# Patient Record
Sex: FEMALE | Race: BLACK OR AFRICAN AMERICAN | Employment: OTHER | URBAN - METROPOLITAN AREA
[De-identification: names, ages, dates, MRNs, and addresses within clinical notes are randomized per-mention and may not be internally consistent; named-entity substitution may affect disease eponyms.]

---

## 2017-04-07 ENCOUNTER — OFFICE VISIT (OUTPATIENT)
Dept: PAIN MANAGEMENT | Age: 47
End: 2017-04-07

## 2017-04-07 DIAGNOSIS — Z71.89 COUNSELING AND COORDINATION OF CARE: Primary | ICD-10-CM

## 2017-04-07 NOTE — PROGRESS NOTES
Ms. Johanny Gilman attended the Education Class facilitated by Arabella Mahmood LPN. Objectives of this class are to review practice policies, protocols and the Controlled Substances Consent and Treatment Agreement, discuss \"what if\" scenarios, introduce staff, and provide an opportunity for questions and answers. Ultimately, goals for this class are for Ms. Johanny Gilman to:    · Be educated - Learn as much as possible about her pain and related treatment, our policies and the Controlled Substances Agreement. · Be responsible - Follow the providers advice regarding treatment recommendations, medications, and prescription information. · Be confident - Better manage her pain and return to a more functional lifestyle. At least 45 minutes was spent with the patient in face-to-face contact today.

## 2017-04-14 ENCOUNTER — OFFICE VISIT (OUTPATIENT)
Dept: PAIN MANAGEMENT | Age: 47
End: 2017-04-14

## 2017-04-14 VITALS
HEART RATE: 71 BPM | SYSTOLIC BLOOD PRESSURE: 167 MMHG | WEIGHT: 239 LBS | DIASTOLIC BLOOD PRESSURE: 86 MMHG | BODY MASS INDEX: 36.34 KG/M2

## 2017-04-14 DIAGNOSIS — R29.818 POSITIVE LHERMITTE'S SIGN: ICD-10-CM

## 2017-04-14 DIAGNOSIS — G89.4 CHRONIC PAIN SYNDROME: ICD-10-CM

## 2017-04-14 DIAGNOSIS — G89.0 CENTRAL PAIN SYNDROME: Primary | ICD-10-CM

## 2017-04-14 DIAGNOSIS — G35 MS (MULTIPLE SCLEROSIS) (HCC): ICD-10-CM

## 2017-04-14 DIAGNOSIS — Z79.899 ENCOUNTER FOR LONG-TERM (CURRENT) USE OF HIGH-RISK MEDICATION: ICD-10-CM

## 2017-04-14 LAB
ALCOHOL UR POC: NORMAL
AMPHETAMINES UR POC: NEGATIVE
BARBITURATES UR POC: NEGATIVE
BENZODIAZEPINES UR POC: NEGATIVE
BUPRENORPHINE UR POC: NORMAL
CANNABINOIDS UR POC: NEGATIVE
CARISOPRODOL UR POC: NORMAL
COCAINE UR POC: NEGATIVE
FENTANYL UR POC: NEGATIVE
MDMA/ECSTASY UR POC: NORMAL
METHADONE UR POC: NEGATIVE
METHAMPHETAMINE UR POC: NEGATIVE
METHYLPHENIDATE UR POC: NEGATIVE
OPIATES UR POC: NEGATIVE
OXYCODONE UR POC: NEGATIVE
PHENCYCLIDINE UR POC: NEGATIVE
PROPOXYPHENE UR POC: NORMAL
TRAMADOL UR POC: NORMAL
TRICYCLICS UR POC: NEGATIVE

## 2017-04-14 RX ORDER — OXYCODONE AND ACETAMINOPHEN 5; 325 MG/1; MG/1
1 TABLET ORAL
Qty: 60 TAB | Refills: 0 | Status: SHIPPED | OUTPATIENT
Start: 2017-05-12 | End: 2017-06-15 | Stop reason: SDUPTHER

## 2017-04-14 RX ORDER — PREGABALIN 75 MG/1
75 CAPSULE ORAL 2 TIMES DAILY
Qty: 60 CAP | Refills: 5 | Status: SHIPPED | OUTPATIENT
Start: 2017-04-14 | End: 2017-05-14

## 2017-04-14 RX ORDER — LEVETIRACETAM 250 MG/1
TABLET ORAL AS NEEDED
COMMUNITY
End: 2021-12-17

## 2017-04-14 RX ORDER — OXYCODONE AND ACETAMINOPHEN 5; 325 MG/1; MG/1
1 TABLET ORAL
Qty: 60 TAB | Refills: 0 | Status: SHIPPED | OUTPATIENT
Start: 2017-04-14 | End: 2017-06-15 | Stop reason: SDUPTHER

## 2017-04-14 RX ORDER — SIMVASTATIN 20 MG/1
20 TABLET, FILM COATED ORAL
COMMUNITY

## 2017-04-14 NOTE — PROGRESS NOTES
HISTORY OF PRESENT ILLNESS  Danette Simpson is a 48 y.o. female. HPI she returns to the Center for pain management for evaluation and treatment of chronic, severe pain related to underlying multiple sclerosis. She was originally seen at the Center on 4/22/15. Her history in brief as follows: She was diagnosed with multiple sclerosis in 2007. CSF was positive. She was initially treated with Avonex. She received 2 doses of Tysabri but developed an allergic reaction as well as JCV antibody positivity. She was then treated with Tecfidera until recently when it was suggested that she try Gilenya. She was reluctant to start this medication secondary to potential side effects and was then started on Plegridy. She is currently receiving Aubagio which she does not feel is particularly effective. She continues to have significant pain which includes fluctuating muscle spasms, headaches, stiffness of the lower extremities, and headaches. Particularly troubling is a positive Lhermitte sign    She has been tried on Keppra, gabapentin, Vicodin and Percocet. She believes she may have tried Lyrica in the past but does not recall. Patient has been averaging 7 out of 10 in his tenuous. Physical activity and mobility as well as mood and sleep are all fair. Low risk is identified on the opioid risk screening tool    A score of 20 on the PHQ-9 is consistent with severe affect of distress    Her PCS score of 37 is consistent with significant catastrophic behavior elevation of the rumination, magnification, and helplessness subscales are noted. A current review of the  does not identify any inconsistency. UDS obtained and reviewed; formal confirmation from laboratory is pending. A salivary fluid specimen is obtained and forwarded to the laboratory for cytogenetic analysis. Review of Systems   Constitutional: Positive for malaise/fatigue.    Gastrointestinal: Positive for constipation, heartburn and nausea. Musculoskeletal: Positive for back pain, joint pain (polyarticular), myalgias and neck pain. Neurological: Positive for weakness (generalized). Psychiatric/Behavioral: The patient has insomnia. All other systems reviewed and are negative. Physical Exam   Constitutional: She is oriented to person, place, and time. She appears cachectic. She has a sickly appearance. She appears distressed. HENT:   Head: Normocephalic and atraumatic. Right Ear: External ear normal.   Left Ear: External ear normal.   Nose: Nose normal.   Mouth/Throat: Oropharynx is clear and moist. No oropharyngeal exudate. Eyes: Conjunctivae and EOM are normal. Pupils are equal, round, and reactive to light. Right eye exhibits no discharge. Left eye exhibits no discharge. No scleral icterus. Neck: Muscular tenderness: spasm. Decreased range of motion present. No Brudzinski's sign and no Kernig's sign noted. No thyromegaly present. Cardiovascular: Normal rate, regular rhythm and normal heart sounds. Pulmonary/Chest: Effort normal and breath sounds normal. No respiratory distress. She has no wheezes. She has no rales. Abdominal: Soft. She exhibits no distension. There is no tenderness. There is no rebound and no guarding. Musculoskeletal: She exhibits tenderness. Right shoulder: She exhibits decreased range of motion and tenderness. Left shoulder: She exhibits decreased range of motion and tenderness. Right elbow: She exhibits decreased range of motion. Tenderness found. Left elbow: She exhibits decreased range of motion. Tenderness found. Right wrist: She exhibits decreased range of motion and tenderness. Left wrist: She exhibits decreased range of motion and tenderness. Right hip: She exhibits decreased range of motion and tenderness. Left hip: She exhibits decreased range of motion and tenderness. Right knee: She exhibits decreased range of motion. Tenderness found. Left knee: She exhibits decreased range of motion. Tenderness found. Right ankle: She exhibits decreased range of motion. Tenderness. Left ankle: She exhibits decreased range of motion. Tenderness. Lumbar back: She exhibits decreased range of motion, tenderness, pain and spasm. Neurological: She is alert and oriented to person, place, and time. She has normal reflexes. No cranial nerve deficit or sensory deficit. She exhibits normal muscle tone. Gait abnormal. Coordination normal.   Reflex Scores:       Tricep reflexes are 2+ on the right side and 2+ on the left side. Bicep reflexes are 2+ on the right side and 2+ on the left side. Brachioradialis reflexes are 2+ on the right side and 2+ on the left side. Patellar reflexes are 2+ on the right side and 2+ on the left side. Achilles reflexes are 2+ on the right side and 2+ on the left side. Skin: Skin is warm. No rash noted. Psychiatric: Her speech is normal and behavior is normal. Judgment and thought content normal. She exhibits a depressed mood. Nursing note and vitals reviewed. ASSESSMENT and PLAN  Encounter Diagnoses   Name Primary?  Central pain syndrome Yes    Encounter for long-term (current) use of high-risk medication     MS (multiple sclerosis) (HCC)     Positive Lhermitte's sign     Chronic pain syndrome      Lyrica will be started at 75 mg twice daily to help her neuropathic pain  Percocet, 5/325, up to 2 times daily will be utilized for breakthrough pain as this has proven beneficial in the past.    Further recommendations will be based upon the above. No concerns are raised for misuse, abuse, or diversion. 1. Pain medications are prescribed with the objective of pain relief and improved physical and psychosocial function in this patient. 2. Counseled patient on proper use of prescribed medications and reviewed opioid contract.   3. Counseled patient about chronic medical conditions and their relationship to anxiety and depression and recommended mental health support as needed. 4. Reviewed with patient self-help tools, home exercise, and lifestyle changes to assist the patient in self-management of symptoms. 5. Advised patient to have a primary care provider to continue care for health maintenance and general medical conditions and support for referral to specialty care as needed. 6. Reviewed with patient the treatment plan, goals of treatment plan, and limitations of treatment plan, to include the potential for side effects from medications and procedures. If side effects occur, it is the responsibility of the patient to inform the clinic so that a change in the treatment plan can be made in a safe manner. The patient is advised that stopping prescribed medication may cause an increase in symptoms and possible medication withdrawal symptoms. The patient is informed an emergency room evaluation may be necessary if this occurs. DISPOSITION: The patients condition and plan were discussed at length and all questions were answered. The patient agrees with the plan.     Counseling occupied > 50% of visit:  Total time: 45 minutes

## 2017-05-10 ENCOUNTER — TELEPHONE (OUTPATIENT)
Dept: PAIN MANAGEMENT | Age: 47
End: 2017-05-10

## 2017-05-10 RX ORDER — GABAPENTIN 300 MG/1
300 CAPSULE ORAL 3 TIMES DAILY
Qty: 90 CAP | Refills: 5 | Status: SHIPPED | OUTPATIENT
Start: 2017-05-10 | End: 2017-06-09

## 2017-05-10 NOTE — TELEPHONE ENCOUNTER
Lyrica was denied by [employer] covers the prescribed medication for members who have had an unsuccessful 30 day tral of gabapentin AND one of the following medications: duloxetine, lidocaine patch, amitriptyline, desipramine, imipramine, nortriptyline or fluoxetine. Notes were submitted with pa - still denied lyrica. Unsuccessful trial and failure of duloxetine has been documented. Pt has been with [facility] since 2014 - they check pharmacy claims and apparently did not find a claim for gabapentin - even though it was in the progress note she had tried and failed, gabapentin this was not accepted.

## 2017-05-22 ENCOUNTER — APPOINTMENT (OUTPATIENT)
Dept: CT IMAGING | Age: 47
End: 2017-05-22
Attending: EMERGENCY MEDICINE
Payer: OTHER GOVERNMENT

## 2017-05-22 ENCOUNTER — HOSPITAL ENCOUNTER (EMERGENCY)
Age: 47
Discharge: HOME | End: 2017-05-22
Attending: EMERGENCY MEDICINE
Payer: OTHER GOVERNMENT

## 2017-05-22 VITALS
OXYGEN SATURATION: 100 % | HEART RATE: 68 BPM | BODY MASS INDEX: 34.4 KG/M2 | TEMPERATURE: 98.2 F | SYSTOLIC BLOOD PRESSURE: 142 MMHG | WEIGHT: 227 LBS | DIASTOLIC BLOOD PRESSURE: 83 MMHG | RESPIRATION RATE: 16 BRPM | HEIGHT: 68 IN

## 2017-05-22 DIAGNOSIS — R51.9 ACUTE NONINTRACTABLE HEADACHE, UNSPECIFIED HEADACHE TYPE: Primary | ICD-10-CM

## 2017-05-22 PROCEDURE — 70450 CT HEAD/BRAIN W/O DYE: CPT

## 2017-05-22 PROCEDURE — 74011250636 HC RX REV CODE- 250/636: Performed by: EMERGENCY MEDICINE

## 2017-05-22 PROCEDURE — 74011250637 HC RX REV CODE- 250/637: Performed by: EMERGENCY MEDICINE

## 2017-05-22 PROCEDURE — 99283 EMERGENCY DEPT VISIT LOW MDM: CPT

## 2017-05-22 RX ORDER — BUTALBITAL, ASPIRIN, CAFFEINE AND CODEINE PHOSPHATE 50; 325; 40; 30 MG/1; MG/1; MG/1; MG/1
1 CAPSULE ORAL
Qty: 20 CAP | Refills: 0 | Status: SHIPPED | OUTPATIENT
Start: 2017-05-22 | End: 2017-08-20

## 2017-05-22 RX ORDER — ONDANSETRON 4 MG/1
4 TABLET, ORALLY DISINTEGRATING ORAL
Status: COMPLETED | OUTPATIENT
Start: 2017-05-22 | End: 2017-05-22

## 2017-05-22 RX ORDER — HYDROMORPHONE HYDROCHLORIDE 1 MG/ML
1 INJECTION, SOLUTION INTRAMUSCULAR; INTRAVENOUS; SUBCUTANEOUS
Status: DISCONTINUED | OUTPATIENT
Start: 2017-05-22 | End: 2017-05-22

## 2017-05-22 RX ORDER — OXYCODONE AND ACETAMINOPHEN 5; 325 MG/1; MG/1
2 TABLET ORAL
Status: COMPLETED | OUTPATIENT
Start: 2017-05-22 | End: 2017-05-22

## 2017-05-22 RX ORDER — ONDANSETRON 4 MG/1
4 TABLET, ORALLY DISINTEGRATING ORAL
Qty: 14 TAB | Refills: 0 | Status: SHIPPED | OUTPATIENT
Start: 2017-05-22 | End: 2018-06-27 | Stop reason: ALTCHOICE

## 2017-05-22 RX ADMIN — ONDANSETRON 4 MG: 4 TABLET, ORALLY DISINTEGRATING ORAL at 23:13

## 2017-05-22 RX ADMIN — OXYCODONE HYDROCHLORIDE AND ACETAMINOPHEN 1 TABLET: 5; 325 TABLET ORAL at 23:14

## 2017-05-22 RX ADMIN — ONDANSETRON 4 MG: 4 TABLET, ORALLY DISINTEGRATING ORAL at 22:37

## 2017-05-23 NOTE — ED NOTES
This note will not be viewable in [address] Patient spit out Zofran tablet stating it tastes bad. Refused Dilaudid 1mg as it is an injection and feared it would hurt. This note will not be viewable in [address]

## 2017-05-23 NOTE — DISCHARGE INSTRUCTIONS
Return for pain, fever, shortness of breath, vomiting, decreased fluid intake, weakness, numbness, dizziness, or any change or concerns. Headache: Care Instructions  Your Care Instructions    Headaches have many possible causes. Most headaches aren't a sign of a more serious problem, and they will get better on their own. Home treatment may help you feel better faster. The doctor has checked you carefully, but problems can develop later. If you notice any problems or new symptoms, get medical treatment right away. Follow-up care is a key part of your treatment and safety. Be sure to make and go to all appointments, and call your doctor if you are having problems. It's also a good idea to know your test results and keep a list of the medicines you take. How can you care for yourself at home? · Do not drive if you have taken a prescription pain medicine. · Rest in a quiet, dark room until your headache is gone. Close your eyes and try to relax or go to sleep. Don't watch TV or read. · Put a cold, moist cloth or cold pack on the painful area for 10 to 20 minutes at a time. Put a thin cloth between the cold pack and your skin. · Use a warm, moist towel or a heating pad set on low to relax tight shoulder and neck muscles. · Have someone gently massage your neck and shoulders. · Take pain medicines exactly as directed. ¨ If the doctor gave you a prescription medicine for pain, take it as prescribed. ¨ If you are not taking a prescription pain medicine, ask your doctor if you can take an over-the-counter medicine. · Be careful not to take pain medicine more often than the instructions allow, because you may get worse or more frequent headaches when the medicine wears off. · Do not ignore new symptoms that occur with a headache, such as a fever, weakness or numbness, vision changes, or confusion. These may be signs of a more serious problem.   To prevent headaches  · Keep a headache diary so you can figure out what triggers your headaches. Avoiding triggers may help you prevent headaches. Record when each headache began, how long it lasted, and what the pain was like (throbbing, aching, stabbing, or dull). Write down any other symptoms you had with the headache, such as nausea, flashing lights or dark spots, or sensitivity to bright light or loud noise. Note if the headache occurred near your period. List anything that might have triggered the headache, such as certain foods (chocolate, cheese, wine) or odors, smoke, bright light, stress, or lack of sleep. · Find healthy ways to deal with stress. Headaches are most common during or right after stressful times. Take time to relax before and after you do something that has caused a headache in the past.  · Try to keep your muscles relaxed by keeping good posture. Check your jaw, face, neck, and shoulder muscles for tension, and try relaxing them. When sitting at a desk, change positions often, and stretch for 30 seconds each hour. · Get plenty of sleep and exercise. · Eat regularly and well. Long periods without food can trigger a headache. · Treat yourself to a massage. Some people find that regular massages are very helpful in relieving tension. · Limit caffeine by not drinking too much coffee, tea, or soda. But don't quit caffeine suddenly, because that can also give you headaches. · Reduce eyestrain from computers by blinking frequently and looking away from the computer screen every so often. Make sure you have proper eyewear and that your monitor is set up properly, about an arm's length away. · Seek help if you have depression or anxiety. Your headaches may be linked to these conditions. Treatment can both prevent headaches and help with symptoms of anxiety or depression. When should you call for help? Call 911 anytime you think you may need emergency care. For example, call if:  · You have signs of a stroke.  These may include:  ¨ Sudden numbness, paralysis, or weakness in your face, arm, or leg, especially on only one side of your body. ¨ Sudden vision changes. ¨ Sudden trouble speaking. ¨ Sudden confusion or trouble understanding simple statements. ¨ Sudden problems with walking or balance. ¨ A sudden, severe headache that is different from past headaches. Call your doctor now or seek immediate medical care if:  · You have a new or worse headache. · Your headache gets much worse. Where can you learn more? Go to http://waldemar-trino.info/. Enter M271 in the search box to learn more about \"Headache: Care Instructions. \"  Current as of: October 14, 2016  Content Version: 11.2  © 7693-3436 [employer]. Care instructions adapted under license by [employer] (which disclaims liability or warranty for this information). If you have questions about a medical condition or this instruction, always ask your healthcare professional. [place] [address] any warranty or liability for your use of this information.

## 2017-05-23 NOTE — ED PROVIDER NOTES
HPI Comments: 10:26 PM [de-identified] R Calvin is a 48 y.o. female with hx of HTN, MS, migraines, depression, and insomnia who presents to the ED c/o HA to the top of her head onset 1.5 hours ago while watching TV. Pain is similar to her typical migraine, is described as stabbing pain, and rated at 10/10. Pt also c/o photophobia, nausea, and blurred vision while walking. Pt tried Fioricet with no relief. Pt denies vomiting, numbness, weakness, leg pain or swelling, or any other sx at this time. The history is provided by the patient. No  was used. Past Medical History:   Diagnosis Date    Depression     Hypertension     Insomnia     Migraines     MS (multiple sclerosis) (HCC)     Sleep apnea     mild, no CPAP    Urinary urgency     Vision decreased        Past Surgical History:   Procedure Laterality Date    HX  SECTION      x1    HX GYN  2002    tubal ligation    HX HYSTERECTOMY  14         Family History:   Problem Relation Age of Onset    Cancer Father     MS Sister     MS Brother        Social History     Social History    Marital status: SINGLE     Spouse name: N/A    Number of children: N/A    Years of education: N/A     Occupational History    Not on file. Social History Main Topics    Smoking status: Former Smoker     Packs/day: 0.25     Years: 10.00     Types: Cigarettes     Quit date: 2017    Smokeless tobacco: Not on file    Alcohol use No    Drug use: No    Sexual activity: Not on file     Other Topics Concern    Not on file     Social History Narrative         ALLERGIES: Mushroom flavor; Olive extract; Pcn [penicillins]; and Shellfish derived    Review of Systems   Constitutional: Negative for chills, fatigue and fever. HENT: Negative for congestion, rhinorrhea and sore throat. Eyes: Positive for photophobia and visual disturbance (blurred vision). Respiratory: Negative for cough and shortness of breath.     Cardiovascular: Negative for chest pain, palpitations and leg swelling. Gastrointestinal: Positive for nausea. Negative for abdominal pain, diarrhea and vomiting. Genitourinary: Negative for dysuria, hematuria and urgency. Musculoskeletal: Negative for arthralgias and myalgias. Skin: Negative for rash and wound. Neurological: Positive for headaches. Negative for dizziness, weakness and numbness. Psychiatric/Behavioral: The patient is not nervous/anxious. All other systems reviewed and are negative. Vitals:    05/22/17 2210 05/22/17 2330   BP: 151/89 142/83   Pulse: 61 68   Resp: 19 16   Temp: 98.2 °F (36.8 °C)    SpO2: 99% 100%   Weight: 103 kg (227 lb)    Height: 5' 8\" (1.727 m)             Physical Exam   Constitutional: She is oriented to person, place, and time. She appears well-developed. HENT:   Head: Normocephalic. Mouth/Throat: Oropharynx is clear and moist.   Mid-parietal scalp tenderness    Eyes: Pupils are equal, round, and reactive to light. Neck: Normal range of motion. Cardiovascular: Normal rate and normal heart sounds. No murmur heard. Pulmonary/Chest: Effort normal. She has no wheezes. She has no rales. Abdominal: Soft. There is no tenderness. Musculoskeletal: Normal range of motion. She exhibits no edema. Neurological: She is alert and oriented to person, place, and time. Skin: Skin is warm and dry. Nursing note and vitals reviewed. [facility]  ED Course       Procedures    Vitals:  No data found. Medications ordered:   Medications   ondansetron (ZOFRAN ODT) tablet 4 mg (4 mg Oral Given 5/22/17 2237)   oxyCODONE-acetaminophen (PERCOCET) 5-325 mg per tablet 2 Tab (1 Tab Oral Given 5/22/17 2314)   ondansetron (ZOFRAN ODT) tablet 4 mg (4 mg Oral Given 5/22/17 2313)         Lab findings:  No results found for this or any previous visit (from the past 12 hour(s)).         X-Ray, CT or other radiology findings or impressions:  CT HEAD WO CONT   Final Result   Impression:    Normal CT of the head. Progress notes, Consult notes or additional Procedure notes:   11:36 PM I have reevaluated the patient. Patient is feeling better. Declines further tx and wants discharge at this time. Reviewed all results with pt and pt agrees with plan for discharge and appropriate follow up. All questions answered at this time. Patient was discharged in stable condition. Patient is to return to emergency department for any new or worsening condition. Disposition:  Diagnosis:   1. Acute nonintractable headache, unspecified headache type        Disposition: home    Follow-up Information     Follow up With Details Comments [facility] MD MERRILL Schedule an appointment as soon as possible for a visit in 1 day  [address]  [phone]             Discharge Medication List as of 5/22/2017 11:38 PM      START taking these medications    Details   ondansetron (ZOFRAN ODT) 4 mg disintegrating tablet Take 1 Tab by mouth every eight (8) hours as needed for Nausea. , Print, Disp-14 Tab, R-0      codeine-butalbital-aspirin-caffeine (FIORINAL-CODEINE #3) 67--40 mg per capsule Take 1 Cap by mouth every four (4) hours as needed for Pain for up to 90 days. Max Daily Amount: 6 Caps. Maximum 6 capsules daily, Print, Disp-20 Cap, R-0         CONTINUE these medications which have NOT CHANGED    Details   gabapentin (NEURONTIN) 300 mg capsule Take 1 Cap by mouth three (3) times daily for 30 days. Indications: NEUROPATHIC PAIN, Normal, Disp-90 Cap, R-5      simvastatin (ZOCOR) 20 mg tablet Take  by mouth nightly., Historical Med      levETIRAcetam (KEPPRA) 250 mg tablet Take  by mouth two (2) times a day., Historical Med      oxyCODONE-acetaminophen (PERCOCET) 5-325 mg per tablet Take 1 Tab by mouth two (2) times daily as needed for Pain for up to 30 days. Max Daily Amount: 2 Tabs.  Indications: Pain, Print, Disp-60 Tab, R-0      teriflunomide (AUBAGIO) 14 mg tab Take  by mouth daily. , Historical Med      omeprazole (PRILOSEC) 20 mg capsule Take 20 mg by mouth daily. , Historical Med      EPINEPHrine (EPIPEN) 0.3 mg/0.3 mL (1:1,000) injection 0.3 mL by IntraMUSCular route once as needed for up to 1 dose., Print, Disp-0.6 mL, R-0      lisinopril (PRINIVIL, ZESTRIL) 10 mg tablet 10 mg., Historical Med      ergocalciferol (VITAMIN D2) 50,000 unit capsule Take 50,000 Units by mouth., Historical Med         STOP taking these medications       butalbital-acetaminophen-caffeine (FIORICET) -40 mg per tablet Comments:   Reason for Stopping:                    Scribe Attestation:   Abi Todd acting as a scribe for and in the presence of Hermann Zamora MD May 22, 2017 at 10:21 PM     Signed by: Fahad Alex Chi, May 22, 2017, 10:21 PM    Provider Attestation:   I personally performed the services described in the documentation, reviewed the documentation, as recorded by the scribe in my presence, and it accurately and completely records my words and actions.      Reviewed and signed by:  Hermann Zamora MD

## 2017-06-15 ENCOUNTER — OFFICE VISIT (OUTPATIENT)
Dept: PAIN MANAGEMENT | Age: 47
End: 2017-06-15

## 2017-06-15 VITALS
BODY MASS INDEX: 34.52 KG/M2 | SYSTOLIC BLOOD PRESSURE: 113 MMHG | WEIGHT: 227 LBS | HEART RATE: 72 BPM | DIASTOLIC BLOOD PRESSURE: 80 MMHG

## 2017-06-15 DIAGNOSIS — G89.4 CHRONIC PAIN SYNDROME: ICD-10-CM

## 2017-06-15 DIAGNOSIS — G35 MS (MULTIPLE SCLEROSIS) (HCC): ICD-10-CM

## 2017-06-15 DIAGNOSIS — G89.0 CENTRAL PAIN SYNDROME: ICD-10-CM

## 2017-06-15 RX ORDER — TIZANIDINE 4 MG/1
4 TABLET ORAL 3 TIMES DAILY
Qty: 90 TAB | Refills: 1 | Status: SHIPPED | OUTPATIENT
Start: 2017-06-15 | End: 2017-09-12 | Stop reason: SDUPTHER

## 2017-06-15 RX ORDER — OXYCODONE AND ACETAMINOPHEN 5; 325 MG/1; MG/1
1 TABLET ORAL
Qty: 60 TAB | Refills: 0 | Status: SHIPPED | OUTPATIENT
Start: 2017-07-22 | End: 2017-11-09 | Stop reason: SDUPTHER

## 2017-06-15 RX ORDER — OXYCODONE AND ACETAMINOPHEN 5; 325 MG/1; MG/1
1 TABLET ORAL
Qty: 60 TAB | Refills: 0 | Status: SHIPPED | OUTPATIENT
Start: 2017-06-23 | End: 2017-07-23

## 2017-06-15 NOTE — PROGRESS NOTES
Nursing Notes    Patient presents to the office today in follow-up. Patient rates her pain at 8/10 on the numerical pain scale. Reviewed medications with counts as follows:    Rx Date filled Qty Dispensed Pill Count Last Dose Short   Oxycodone 5 mg 05/24/17 28 * 10 today no         Comments: pt only given 28 day supply    POC UDS was not performed in office today no    Any new labs or imaging since last appointment? NO     Have you been to an emergency room (ER) or urgent care clinic since your last visit? YES     Have you been hospitalized since your last visit? YES  [facility]   If yes, where, when, and reason for visit? Headache from MS, joint pain    Have you seen or consulted any other health care providers outside of the [facility]  since your last visit? NO     If yes, where, when, and reason for visit? Health Maintenance reviewed .

## 2017-06-15 NOTE — PATIENT INSTRUCTIONS
1. Continue current plan with no evidence of addiction or diversion. Stable on current medication without adverse events. 2. Refill oxycodone 5/325 mg up to 2 times daily as needed. 3. Continue gabapentin 300 mg tapering up to 3 times daily. 4. Add Zanaflex 4 mg up to 3 times daily as needed for muscle spasm/pain. 1 refill   5. Discussed risks of addiction, dependency, and opioid induced hyperalgesia.    6. Return to clinic in 2 month

## 2017-06-15 NOTE — PROGRESS NOTES
HISTORY OF PRESENT ILLNESS  Danette Simpson is a 48 y.o. female    HPI: Ms. Anna Kim  returns today for f/u of chronic, severe pain related to underlying multiple sclerosis. She was diagnosed with multiple sclerosis in 2007 with CSF positive. Prior treatment with Avonex, Tysabri, Tecfidera, Avenox, Plegridy, and Gilenya by her Rheumatologist with no improvement. Currently treated with Aubagio. She has also tried Keppra, gabapentin, Vicodin, flexeril, and baclofen. Lyrica denied by her insurance. Today is my first visit with Ms. Simpson. She was doing well previously with Lyrica with significant improvement in her pain. She says that her insurance has recently denied her request for Lyrica refill. She has tried many different medications and treatments in the past with little benefit. She has tried gabapentin in the past as well with no improvement. She was started back on gabapentin when her Lyrica was denied. She reports minimal improvement so far but has been trouble tolerating the medication. I have recommended that she taper up her medication slowly. She verbally agrees. She continues to complain of significant muscle spasms, headaches, stiffness of lower extremities, and headaches. She has tried muscle relaxers in the past including Flexeril and baclofen. She was doing well with baclofen but was causing nausea. I have recommended that we try Zanaflex. I will have her follow-up in 2 months for further evaluation recommendation. Current medication management consists of Percocet, 5/325, up to 2 times daily PRN and gabapentin 300 mg q 8 hrs. Medications are helping with pain control and quality of life. Her pain is 5-6/10 with medication and 9/10 without. Pt describes pain as sharp and muscle spasms. Pain may affect her walking due to muscle spasms. Aggravating factors include standing and walking. Relieved with rest, medication, and avoiding painful activities.  Current treatment is helping to improve general activity, mood, walking, sleep, enjoyment of life    In the past 30 days, the patient reports approximately 40% pain relief with current treatment/medications. Pt does report constipation but is well controlled with diet modifications  She  is otherwise doing well with no other complaints today. She denies any adverse events including nausea, vomiting, dizziness, increased constipation, hallucinations, or seizures. Allergies   Allergen Reactions    Mushroom Flavor Other (comments)     Mushroom,    Olive Extract Other (comments)    Pcn [Penicillins] Unknown (comments)    Shellfish Derived Other (comments)       Past Surgical History:   Procedure Laterality Date    HX  SECTION      x1    HX GYN  2002    tubal ligation    HX HYSTERECTOMY  14         Review of Systems   Constitutional: Positive for malaise/fatigue. Negative for chills, fever and weight loss. HENT: Negative for congestion and sore throat. Eyes: Negative for blurred vision and double vision. Respiratory: Negative for cough, shortness of breath and wheezing. Cardiovascular: Negative for chest pain and palpitations. Gastrointestinal: Positive for constipation, heartburn and nausea. Negative for abdominal pain, diarrhea and vomiting. Genitourinary: Negative. Musculoskeletal: Positive for back pain, joint pain, myalgias and neck pain. Neurological: Positive for weakness. Negative for dizziness, seizures, loss of consciousness and headaches. Endo/Heme/Allergies: Does not bruise/bleed easily. Psychiatric/Behavioral: Negative for depression. The patient is not nervous/anxious and does not have insomnia. All other systems reviewed and are negative. Physical Exam   Constitutional: She is oriented to person, place, and time and well-developed, well-nourished, and in no distress. No distress. HENT:   Head: Normocephalic and atraumatic.    Eyes: EOM are normal.   Pulmonary/Chest: Effort normal.   Neurological: She is alert and oriented to person, place, and time. Skin: Skin is warm and dry. No rash noted. She is not diaphoretic. No erythema. Psychiatric: Mood, memory, affect and judgment normal.   Nursing note and vitals reviewed. ASSESSMENT:    1. MS (multiple sclerosis) ([facility] [address]    2. Central pain syndrome    3. Chronic pain syndrome           [place] Prescription Monitoring Program was reviewed which does not demonstrate aberrancies and/or inconsistencies with regard to the historical prescribing of controlled medications to this patient by other providers. PLAN / Pt Instructions:  1. Continue current plan with no evidence of addiction or diversion. Stable on current medication without adverse events. 2. Refill oxycodone 5/325 mg up to 2 times daily as needed. 3. Continue gabapentin 300 mg tapering up to 3 times daily. 4. Add Zanaflex 4 mg up to 3 times daily as needed for muscle spasm/pain. 1 refill   5. Discussed risks of addiction, dependency, and opioid induced hyperalgesia. 6. Return to clinic in 2 month    Medications Ordered Today   Medications    oxyCODONE-acetaminophen (PERCOCET) 5-325 mg per tablet     Sig: Take 1 Tab by mouth two (2) times daily as needed for Pain for up to 30 days. Max Daily Amount: 2 Tabs. Indications: Pain     Dispense:  60 Tab     Refill:  0    oxyCODONE-acetaminophen (PERCOCET) 5-325 mg per tablet     Sig: Take 1 Tab by mouth two (2) times daily as needed for Pain for up to 30 days. Max Daily Amount: 2 Tabs. Indications: Pain     Dispense:  60 Tab     Refill:  0    tiZANidine (ZANAFLEX) 4 mg tablet     Sig: Take 1 Tab by mouth three (3) times daily for 30 days. Dispense:  90 Tab     Refill:  1       Pain medications prescribed with the objective of pain relief and improved physical and psychosocial function in this patient.     Spent 25 minutes with patient today reviewing the treatment plan, goals of treatment plan, and limitations of the treatment plan, to include the potential for side effects from medications and procedures. Jhon Simmons 6/15/2017      Note: Please excuse any typographical errors. Voice recognition software was used for this note and may cause mistakes.

## 2017-09-12 ENCOUNTER — OFFICE VISIT (OUTPATIENT)
Dept: PAIN MANAGEMENT | Age: 47
End: 2017-09-12

## 2017-09-12 VITALS
HEART RATE: 61 BPM | HEIGHT: 68 IN | RESPIRATION RATE: 20 BRPM | BODY MASS INDEX: 34.4 KG/M2 | SYSTOLIC BLOOD PRESSURE: 136 MMHG | TEMPERATURE: 97.5 F | DIASTOLIC BLOOD PRESSURE: 81 MMHG | WEIGHT: 227 LBS

## 2017-09-12 DIAGNOSIS — G89.0 CENTRAL PAIN SYNDROME: ICD-10-CM

## 2017-09-12 DIAGNOSIS — Z79.899 ENCOUNTER FOR LONG-TERM (CURRENT) USE OF HIGH-RISK MEDICATION: ICD-10-CM

## 2017-09-12 DIAGNOSIS — G89.4 CHRONIC PAIN SYNDROME: Primary | ICD-10-CM

## 2017-09-12 DIAGNOSIS — G35 MS (MULTIPLE SCLEROSIS) (HCC): ICD-10-CM

## 2017-09-12 LAB
ALCOHOL UR POC: NORMAL
AMPHETAMINES UR POC: NORMAL
BARBITURATES UR POC: NORMAL
BENZODIAZEPINES UR POC: NORMAL
BUPRENORPHINE UR POC: NORMAL
CANNABINOIDS UR POC: NORMAL
CARISOPRODOL UR POC: NORMAL
COCAINE UR POC: NORMAL
FENTANYL UR POC: NORMAL
MDMA/ECSTASY UR POC: NORMAL
METHADONE UR POC: NORMAL
METHAMPHETAMINE UR POC: NORMAL
METHYLPHENIDATE UR POC: NORMAL
OPIATES UR POC: NORMAL
OXYCODONE UR POC: NORMAL
PHENCYCLIDINE UR POC: NORMAL
PROPOXYPHENE UR POC: NORMAL
TRAMADOL UR POC: NORMAL
TRICYCLICS UR POC: NORMAL

## 2017-09-12 RX ORDER — TIZANIDINE 4 MG/1
4 TABLET ORAL 3 TIMES DAILY
Qty: 90 TAB | Refills: 3 | Status: SHIPPED | OUTPATIENT
Start: 2017-09-12 | End: 2018-05-07 | Stop reason: SDUPTHER

## 2017-09-12 RX ORDER — OXYCODONE AND ACETAMINOPHEN 7.5; 325 MG/1; MG/1
1 TABLET ORAL
Qty: 60 TAB | Refills: 0 | Status: SHIPPED | OUTPATIENT
Start: 2017-10-19 | End: 2017-11-09 | Stop reason: SDUPTHER

## 2017-09-12 RX ORDER — OXYCODONE AND ACETAMINOPHEN 5; 325 MG/1; MG/1
1 TABLET ORAL
Qty: 60 TAB | Refills: 0 | Status: CANCELLED | OUTPATIENT
Start: 2017-09-12 | End: 2017-10-12

## 2017-09-12 RX ORDER — TIZANIDINE 4 MG/1
4 TABLET ORAL 3 TIMES DAILY
Qty: 90 TAB | Refills: 1 | Status: CANCELLED | OUTPATIENT
Start: 2017-09-12 | End: 2017-10-12

## 2017-09-12 RX ORDER — GABAPENTIN 300 MG/1
300 CAPSULE ORAL 3 TIMES DAILY
Qty: 90 CAP | Refills: 5 | Status: CANCELLED | OUTPATIENT
Start: 2017-09-12 | End: 2017-10-12

## 2017-09-12 RX ORDER — OXYCODONE AND ACETAMINOPHEN 7.5; 325 MG/1; MG/1
1 TABLET ORAL
Qty: 60 TAB | Refills: 0 | Status: SHIPPED | OUTPATIENT
Start: 2017-09-20 | End: 2017-11-09 | Stop reason: SDUPTHER

## 2017-09-12 NOTE — PATIENT INSTRUCTIONS
1. Continue current plan with no evidence of addiction or diversion. Stable on current medication without adverse events. 2. Refill and adjust oxycodone 5/325 mg up to 7.5/325 mg 2 times daily as needed. 3. DisContinue gabapentin 300 mg  4. Refill Zanaflex 4 mg up to 3 times daily as needed for muscle spasm/pain. 1 refill   5. Discussed risks of addiction, dependency, and opioid induced hyperalgesia.    6. Return to clinic in 2 month

## 2017-09-12 NOTE — PROGRESS NOTES
HISTORY OF PRESENT ILLNESS  Danette Simpson is a 50 y.o. female    HPI: Ms. Jhoan Bettencourt  returns today for f/u of chronic, severe pain related to underlying multiple sclerosis. She was diagnosed with multiple sclerosis in 2007 with CSF positive. Prior treatment with Avonex, Tysabri, Tecfidera, Avenox, Plegridy, and Gilenya by her Rheumatologist with no improvement. Currently treated with Aubagio. She has also tried Keppra, gabapentin, Vicodin, flexeril, and baclofen. Lyrica denied by her insurance. Ms. Jhoan Bettencourt continues unchanged since last visit. She has been doing well with her current treatment plan but does still continue of pain unimproved with her oxycodone. She has plenty of her medication left over. She says that she does take it when she absolutely has to but does not seem to be that effective. She has had to discontinue gabapentin due to complications with other medications. Lyrica was not approved by her insurance. We did add Zanaflex which has been effective. I have agreed to adjust her Percocet up to 7.5/325 mg. She will continue to use this medication on an as-needed basis only. Current medication management consists of Percocet, 5/325, up to 2 times daily PRN. Medications are helping with pain control and quality of life. Her pain is 6-7/10 with medication and 9/10 without. Pt describes pain as sharp and muscle spasms. Pain may affect her walking due to muscle spasms. Aggravating factors include standing and walking. Relieved with rest, medication, and avoiding painful activities. Current treatment is helping to improve general activity, mood, walking, sleep, enjoyment of life    In the past 30 days, the patient reports approximately 40% pain relief with current treatment/medications. Pt does report constipation but is well controlled with diet modifications  She  is otherwise doing well with no other complaints today.  She denies any adverse events including nausea, vomiting, dizziness, increased constipation, hallucinations, or seizures. Allergies   Allergen Reactions    Mushroom Flavor Other (comments)     Mushroom,    Olive Extract Other (comments)    Pcn [Penicillins] Unknown (comments)    Shellfish Derived Other (comments)       Past Surgical History:   Procedure Laterality Date    HX  SECTION      x1    HX GYN  2002    tubal ligation    HX HYSTERECTOMY  14         Review of Systems   Constitutional: Positive for malaise/fatigue. Negative for chills, fever and weight loss. HENT: Negative for congestion and sore throat. Eyes: Negative for blurred vision and double vision. Respiratory: Negative for cough, shortness of breath and wheezing. Cardiovascular: Negative for chest pain and palpitations. Gastrointestinal: Positive for constipation, heartburn and nausea. Negative for abdominal pain, diarrhea and vomiting. Genitourinary: Negative. Musculoskeletal: Positive for back pain, joint pain, myalgias and neck pain. Neurological: Positive for weakness. Negative for dizziness, seizures, loss of consciousness and headaches. Endo/Heme/Allergies: Does not bruise/bleed easily. Psychiatric/Behavioral: Negative for depression. The patient is not nervous/anxious and does not have insomnia. All other systems reviewed and are negative. Physical Exam   Constitutional: She is oriented to person, place, and time and well-developed, well-nourished, and in no distress. No distress. HENT:   Head: Normocephalic and atraumatic. Eyes: EOM are normal.   Pulmonary/Chest: Effort normal.   Neurological: She is alert and oriented to person, place, and time. Skin: Skin is warm and dry. No rash noted. She is not diaphoretic. No erythema. Psychiatric: Mood, memory, affect and judgment normal.   Nursing note and vitals reviewed. ASSESSMENT:    1. Chronic pain syndrome    2. Central pain syndrome    3. MS (multiple sclerosis) ([facility] [address]    4. Encounter for long-term (current) use of high-risk medication           [place] Prescription Monitoring Program was reviewed which does not demonstrate aberrancies and/or inconsistencies with regard to the historical prescribing of controlled medications to this patient by other providers. PLAN / Pt Instructions:  1. Continue current plan with no evidence of addiction or diversion. Stable on current medication without adverse events. 2. Refill and adjust oxycodone 5/325 mg up to 7.5/325 mg 2 times daily as needed. 3. DisContinue gabapentin 300 mg  4. Refill Zanaflex 4 mg up to 3 times daily as needed for muscle spasm/pain. 1 refill   5. Discussed risks of addiction, dependency, and opioid induced hyperalgesia. 6. Return to clinic in 2 months    Medications Ordered Today   Medications    oxyCODONE-acetaminophen (PERCOCET 7.5) 7.5-325 mg per tablet     Sig: Take 1 Tab by mouth two (2) times daily as needed for Pain for up to 30 days. Max Daily Amount: 2 Tabs. Dispense:  60 Tab     Refill:  0    oxyCODONE-acetaminophen (PERCOCET 7.5) 7.5-325 mg per tablet     Sig: Take 1 Tab by mouth two (2) times daily as needed for Pain for up to 30 days. Max Daily Amount: 2 Tabs. Dispense:  60 Tab     Refill:  0    tiZANidine (ZANAFLEX) 4 mg tablet     Sig: Take 1 Tab by mouth three (3) times daily for 30 days. Dispense:  90 Tab     Refill:  3       Pain medications prescribed with the objective of pain relief and improved physical and psychosocial function in this patient. Spent 25 minutes with patient today reviewing the treatment plan, goals of treatment plan, and limitations of the treatment plan, to include the potential for side effects from medications and procedures. Jhon Uribe 9/12/2017      Note: Please excuse any typographical errors. Voice recognition software was used for this note and may cause mistakes.

## 2017-09-12 NOTE — ACP (ADVANCE CARE PLANNING)
The pt states that she has a living will. This document was not found in the pt's chart. She was asked to bring in a copy of this so that it can be scanned in to her chart. She verbalized understanding.

## 2017-09-12 NOTE — MR AVS SNAPSHOT
Visit Information Date & Time Provider Department Dept. Phone Encounter #  
 9/12/2017  8:40 AM SUAD Sosa [facility] [phone] [id number] Follow-up Instructions Return in about 2 months (around 11/12/2017). Upcoming Health Maintenance Date Due DTaP/Tdap/Td series (1 - Tdap) 7/8/1989 PAP AKA CERVICAL CYTOLOGY 7/8/1989 INFLUENZA AGE 9 TO ADULT 8/1/2017 Allergies as of 9/12/2017  Review Complete On: 9/12/2017 By: SUAD Sosa Severity Noted Reaction Type Reactions Mushroom Flavor  05/05/2015    Other (comments) Mushroom, Olive Extract  05/05/2015    Other (comments) Pcn [Penicillins]  12/15/2015    Unknown (comments) Shellfish Derived  05/05/2015    Other (comments) Current Immunizations  Never Reviewed No immunizations on file. Not reviewed this visit You Were Diagnosed With   
  
 Codes Comments Chronic pain syndrome    -  Primary ICD-10-CM: G89.4 ICD-9-CM: 338.4 Central pain syndrome     ICD-10-CM: G89.0 ICD-9-CM: 338.0 [facility]     ICD-10-CM: G35 
ICD-9-CM: 312 Encounter for long-term (current) use of high-risk medication     ICD-10-CM: Z79.899 ICD-9-CM: V58.69 Vitals BP Pulse Temp Resp Height(growth percentile) Weight(growth percentile) 136/81 61 97.5 °F (36.4 °C) 20 5' 8\" (1.727 m) 227 lb (103 kg) BMI OB Status Smoking Status 34.52 kg/m2 Hysterectomy Former Smoker Vitals History BMI and BSA Data Body Mass Index Body Surface Area 34.52 kg/m 2 2.22 m 2 Preferred Pharmacy Pharmacy Name [phone] - [place], [address] AT [address] [phone] Your Updated Medication List  
  
   
This list is accurate as of: 9/12/17  9:21 AM.  Always use your most recent med list.  
  
  
  
  
 AUBAGIO 14 mg Tab Generic drug:  teriflunomide Take  by mouth daily. EPINEPHrine 0.3 mg/0.3 mL injection Commonly known as:  EPIPEN  
0.3 mL by IntraMUSCular route once as needed for up to 1 dose. levETIRAcetam 250 mg tablet Commonly known as:  KEPPRA Take  by mouth two (2) times a day. lisinopril 10 mg tablet Commonly known as:  PRINIVIL, ZESTRIL  
10 mg.  
  
 ondansetron 4 mg disintegrating tablet Commonly known as:  ZOFRAN ODT Take 1 Tab by mouth every eight (8) hours as needed for Nausea. * oxyCODONE-acetaminophen 7.5-325 mg per tablet Commonly known as:  PERCOCET 7.5 Take 1 Tab by mouth two (2) times daily as needed for Pain for up to 30 days. Max Daily Amount: 2 Tabs. Start taking on:  9/20/2017 * oxyCODONE-acetaminophen 7.5-325 mg per tablet Commonly known as:  PERCOCET 7.5 Take 1 Tab by mouth two (2) times daily as needed for Pain for up to 30 days. Max Daily Amount: 2 Tabs. Start taking on:  10/19/2017 PriLOSEC 20 mg capsule Generic drug:  omeprazole Take 20 mg by mouth daily. simvastatin 20 mg tablet Commonly known as:  ZOCOR Take  by mouth nightly. tiZANidine 4 mg tablet Commonly known as:  Berry Player Take 1 Tab by mouth three (3) times daily for 30 days. VITAMIN D2 50,000 unit capsule Generic drug:  ergocalciferol Take 50,000 Units by mouth. * Notice: This list has 2 medication(s) that are the same as other medications prescribed for you. Read the directions carefully, and ask your doctor or other care provider to review them with you. Prescriptions Printed Refills  
 oxyCODONE-acetaminophen (PERCOCET 7.5) 7.5-325 mg per tablet 0 Starting on: 9/20/2017 Sig: Take 1 Tab by mouth two (2) times daily as needed for Pain for up to 30 days. Max Daily Amount: 2 Tabs. Class: Print Route: Oral  
 oxyCODONE-acetaminophen (PERCOCET 7.5) 7.5-325 mg per tablet 0 Starting on: 10/19/2017 Sig: Take 1 Tab by mouth two (2) times daily as needed for Pain for up to 30 days. Max Daily Amount: 2 Tabs. Class: Print Route: Oral  
  
Prescriptions Sent to Pharmacy Refills  
 tiZANidine (ZANAFLEX) 4 mg tablet 3 Sig: Take 1 Tab by mouth three (3) times daily for 30 days. Class: Normal  
 Pharmacy: [facility] [address] [place] [address] AT [address] Ph #: [phone] Route: Oral  
  
We Performed the Following AMB POC DRUG SCREEN () [ [facility]] DRUG SCREEN [[id number] Custom] Follow-up Instructions Return in about 2 months (around 11/12/2017). Patient Instructions 1. Continue current plan with no evidence of addiction or diversion. Stable on current medication without adverse events. 2. Refill and adjust oxycodone 5/325 mg up to 7.5/325 mg 2 times daily as needed. 3. DisContinue gabapentin 300 mg 
4. Refill Zanaflex 4 mg up to 3 times daily as needed for muscle spasm/pain. 1 refill 5. Discussed risks of addiction, dependency, and opioid induced hyperalgesia. 6. Return to clinic in 2 month Introducing [facility]! Ezraadean Saint introduces [employer] patient portal. Now you can access parts of your medical record, email your doctor's office, and request medication refills online. 1. In your internet browser, go to [email or web address] 2. Click on the First Time User? Click Here link in the Sign In box. You will see the New Member Sign Up page. 3. Enter your [employer] Access Code exactly as it appears below. You will not need to use this code after youve completed the sign-up process. If you do not sign up before the expiration date, you must request a new code. · [employer] Access Code: [email or web address] Expires: 12/11/2017  9:21 AM 
 
4. Enter the last four digits of your Social Security Number (xxxx) and Date of Birth (mm/dd/yyyy) as indicated and click Submit.  You will be taken to the next sign-up page. 5. Create a [employer] ID. This will be your [employer] login ID and cannot be changed, so think of one that is secure and easy to remember. 6. Create a [employer] password. You can change your password at any time. 7. Enter your Password Reset Question and Answer. This can be used at a later time if you forget your password. 8. Enter your e-mail address. You will receive e-mail notification when new information is available in [address] 9. Click Sign Up. You can now view and download portions of your medical record. 10. Click the Download Summary menu link to download a portable copy of your medical information. If you have questions, please visit the Frequently Asked Questions section of the [employer] website. Remember, [employer] is NOT to be used for urgent needs. For medical emergencies, dial 911. Now available from your iPhone and Android! Please provide this summary of care documentation to your next provider. Your primary care clinician is listed as Chris Machado Ii. If you have any questions after today's visit, please call [phone].

## 2017-09-12 NOTE — PROGRESS NOTES
Nursing Notes    Patient presents to the office today in follow-up. Patient rates her pain at 7/10 on the numerical pain scale. Reviewed medications with counts as follows:    Rx Date filled Qty Dispensed Pill Count Last Dose Short   Percocet 5/325 mg  08/01/17 60 19 2 days ago no                                   POC UDS was performed in office today    Any new labs or imaging since last appointment? NO    Have you been to an emergency room (ER) or urgent care clinic since your last visit? NO            Have you been hospitalized since your last visit? NO     If yes, where, when, and reason for visit? Have you seen or consulted any other health care providers outside of the [address] [place] [address]  since your last visit? NO     If yes, where, when, and reason for visit? HM deferred to pcp.

## 2017-11-09 ENCOUNTER — OFFICE VISIT (OUTPATIENT)
Dept: PAIN MANAGEMENT | Age: 47
End: 2017-11-09

## 2017-11-09 VITALS
BODY MASS INDEX: 37.13 KG/M2 | RESPIRATION RATE: 14 BRPM | DIASTOLIC BLOOD PRESSURE: 79 MMHG | HEART RATE: 66 BPM | TEMPERATURE: 98.2 F | SYSTOLIC BLOOD PRESSURE: 136 MMHG | HEIGHT: 68 IN | WEIGHT: 245 LBS

## 2017-11-09 DIAGNOSIS — G35 MS (MULTIPLE SCLEROSIS) (HCC): ICD-10-CM

## 2017-11-09 DIAGNOSIS — G89.4 CHRONIC PAIN SYNDROME: ICD-10-CM

## 2017-11-09 DIAGNOSIS — G89.0 CENTRAL PAIN SYNDROME: ICD-10-CM

## 2017-11-09 RX ORDER — OXYCODONE AND ACETAMINOPHEN 7.5; 325 MG/1; MG/1
1 TABLET ORAL
Qty: 60 TAB | Refills: 0 | Status: SHIPPED | OUTPATIENT
Start: 2018-01-16 | End: 2018-02-06 | Stop reason: SDUPTHER

## 2017-11-09 RX ORDER — OXYCODONE AND ACETAMINOPHEN 5; 325 MG/1; MG/1
1 TABLET ORAL
Qty: 60 TAB | Refills: 0 | Status: SHIPPED | OUTPATIENT
Start: 2018-01-16 | End: 2017-11-09 | Stop reason: CLARIF

## 2017-11-09 RX ORDER — OXYCODONE AND ACETAMINOPHEN 7.5; 325 MG/1; MG/1
1 TABLET ORAL
Qty: 60 TAB | Refills: 0 | Status: SHIPPED | OUTPATIENT
Start: 2017-11-18 | End: 2017-12-18

## 2017-11-09 RX ORDER — OXYCODONE AND ACETAMINOPHEN 7.5; 325 MG/1; MG/1
1 TABLET ORAL
Qty: 60 TAB | Refills: 0 | Status: SHIPPED | OUTPATIENT
Start: 2017-12-17 | End: 2017-11-09 | Stop reason: SDUPTHER

## 2017-11-09 NOTE — PATIENT INSTRUCTIONS
1. Continue current plan with no evidence of addiction or diversion. Stable on current medication without adverse events. 2. Refill oxycodone 7.5/325 mg 2 times daily as needed. 3. Continue Zanaflex 4 mg up to 3 times daily as needed for muscle spasm/pain. 4. Discussed risks of addiction, dependency, and opioid induced hyperalgesia.    5. Return to clinic in 3 months

## 2017-11-09 NOTE — PROGRESS NOTES
Nursing Notes    Patient presents to the office today in follow-up. Patient rates her pain at 5/10 on the numerical pain scale. Reviewed medications with counts as follows:    Rx Date filled Qty Dispensed Pill Count Last Dose Short   Percocet 7.5-325mg 10/19/17 60 8 yesterday Yes 8                                          Comments:     POC UDS was not performed in office today    Any new labs or imaging since last appointment? Agusto Benitez at [address] [place] [address],[address] you been to an emergency room (ER) or urgent care clinic since your last visit? YES for back Araceli            Have you been hospitalized since your last visit? NO     If yes, where, when, and reason for visit? Have you seen or consulted any other health care providers outside of the [address] [place] [address]  since your last visit? YES, PCP     If yes, where, when, and reason for visit? HM deferred to pcp.

## 2017-11-09 NOTE — MR AVS SNAPSHOT
Visit Information Date & Time Provider Department Dept. Phone Encounter #  
 11/9/2017  8:40 AM SUAD Recio [facility] [phone] [id number] Follow-up Instructions Return in about 3 months (around 2/9/2018). Upcoming Health Maintenance Date Due DTaP/Tdap/Td series (1 - Tdap) 7/8/1989 PAP AKA CERVICAL CYTOLOGY 7/8/1989 Influenza Age 5 to Adult 8/1/2017 Allergies as of 11/9/2017  Review Complete On: 11/9/2017 By: SUAD Recio Severity Noted Reaction Type Reactions Mushroom Flavor  05/05/2015    Other (comments) Mushroom, Olive Extract  05/05/2015    Other (comments) Pcn [Penicillins]  12/15/2015    Unknown (comments) Shellfish Derived  05/05/2015    Other (comments) Current Immunizations  Never Reviewed No immunizations on file. Not reviewed this visit You Were Diagnosed With   
  
 Codes Comments [facility]) [facility]     ICD-10-CM: G35 
ICD-9-CM: 782 Central pain syndrome     ICD-10-CM: G89.0 ICD-9-CM: 338.0 Chronic pain syndrome     ICD-10-CM: G89.4 ICD-9-CM: 338. 4 Vitals BP Pulse Temp Resp Height(growth percentile) Weight(growth percentile) 136/79 66 98.2 °F (36.8 °C) 14 5' 8\" (1.727 m) 245 lb (111.1 kg) BMI OB Status Smoking Status 37.25 kg/m2 Hysterectomy Former Smoker Vitals History BMI and BSA Data Body Mass Index Body Surface Area  
 37.25 kg/m 2 2.31 m 2 Preferred Pharmacy Pharmacy Name [facility] [phone] - [place], [place] [address] [address] [phone] Your Updated Medication List  
  
   
This list is accurate as of: 11/9/17  9:32 AM.  Always use your most recent med list.  
  
  
  
  
 AUBAGIO 14 mg Tab Generic drug:  teriflunomide Take  by mouth daily. EPINEPHrine 0.3 mg/0.3 mL injection Commonly known as:  Isabel Yoon  
 0.3 mL by IntraMUSCular route once as needed for up to 1 dose. levETIRAcetam 250 mg tablet Commonly known as:  KEPPRA Take  by mouth two (2) times a day. lisinopril 10 mg tablet Commonly known as:  PRINIVIL, ZESTRIL  
10 mg.  
  
 ondansetron 4 mg disintegrating tablet Commonly known as:  ZOFRAN ODT Take 1 Tab by mouth every eight (8) hours as needed for Nausea. * oxyCODONE-acetaminophen 7.5-325 mg per tablet Commonly known as:  PERCOCET 7.5 Take 1 Tab by mouth two (2) times daily as needed for Pain for up to 30 days. Max Daily Amount: 2 Tabs. Start taking on:  11/18/2017 * oxyCODONE-acetaminophen 7.5-325 mg per tablet Commonly known as:  PERCOCET 7.5 Take 1 Tab by mouth two (2) times daily as needed for Pain for up to 30 days. Max Daily Amount: 2 Tabs. Start taking on:  12/17/2017 * oxyCODONE-acetaminophen 5-325 mg per tablet Commonly known as:  PERCOCET Take 1 Tab by mouth two (2) times daily as needed for Pain for up to 30 days. Max Daily Amount: 2 Tabs. Indications: Pain Start taking on:  1/16/2018 PriLOSEC 20 mg capsule Generic drug:  omeprazole Take 20 mg by mouth daily. simvastatin 20 mg tablet Commonly known as:  ZOCOR Take  by mouth nightly. VITAMIN D2 50,000 unit capsule Generic drug:  ergocalciferol Take 50,000 Units by mouth. * Notice: This list has 3 medication(s) that are the same as other medications prescribed for you. Read the directions carefully, and ask your doctor or other care provider to review them with you. Prescriptions Printed Refills  
 oxyCODONE-acetaminophen (PERCOCET 7.5) 7.5-325 mg per tablet 0 Starting on: 11/18/2017 Sig: Take 1 Tab by mouth two (2) times daily as needed for Pain for up to 30 days. Max Daily Amount: 2 Tabs. Class: Print Route: Oral  
 oxyCODONE-acetaminophen (PERCOCET 7.5) 7.5-325 mg per tablet 0 Starting on: 12/17/2017 Sig: Take 1 Tab by mouth two (2) times daily as needed for Pain for up to 30 days. Max Daily Amount: 2 Tabs. Class: Print Route: Oral  
 oxyCODONE-acetaminophen (PERCOCET) 5-325 mg per tablet 0 Starting on: 1/16/2018 Sig: Take 1 Tab by mouth two (2) times daily as needed for Pain for up to 30 days. Max Daily Amount: 2 Tabs. Indications: Pain Class: Print Route: Oral  
  
Follow-up Instructions Return in about 3 months (around 2/9/2018). Patient Instructions 1. Continue current plan with no evidence of addiction or diversion. Stable on current medication without adverse events. 2. Refill oxycodone 7.5/325 mg 2 times daily as needed. 3. Continue Zanaflex 4 mg up to 3 times daily as needed for muscle spasm/pain. 4. Discussed risks of addiction, dependency, and opioid induced hyperalgesia. 5. Return to clinic in 3 months Introducing [facility]! [employer] introduces BrainBot patient portal. Now you can access parts of your medical record, email your doctor's office, and request medication refills online. 1. In your internet browser, go to https://[employer]"hart 2. Click on the First Time User? Click Here link in the Sign In box. You will see the New Member Sign Up page. 3. Enter your BrainBot Access Code exactly as it appears below. You will not need to use this code after youve completed the sign-up process. If you do not sign up before the expiration date, you must request a new code. · BrainBot Access Code: [email or web address] Expires: 12/11/2017  8:21 AM 
 
4. Enter the last four digits of your Social Security Number (xxxx) and Date of Birth (mm/dd/yyyy) as indicated and click Submit. You will be taken to the next sign-up page. 5. Create a [employer] ID. This will be your BrainBot login ID and cannot be changed, so think of one that is secure and easy to remember. 6. Create a [employer] password. You can change your password at any time. 7. Enter your Password Reset Question and Answer. This can be used at a later time if you forget your password. 8. Enter your e-mail address. You will receive e-mail notification when new information is available in [address] 9. Click Sign Up. You can now view and download portions of your medical record. 10. Click the Download Summary menu link to download a portable copy of your medical information. If you have questions, please visit the Frequently Asked Questions section of the E-Blink website. Remember, E-Blink is NOT to be used for urgent needs. For medical emergencies, dial 911. Now available from your iPhone and Android! Please provide this summary of care documentation to your next provider. Your primary care clinician is listed as Patito Ocasio Ii. If you have any questions after today's visit, please call [phone].

## 2017-11-09 NOTE — PROGRESS NOTES
HISTORY OF PRESENT ILLNESS  Danette Simpson is a 50 y.o. female    HPI: Ms. Óscar Schmid  returns today for f/u of chronic, severe pain related to underlying multiple sclerosis. She was diagnosed with multiple sclerosis in 2007 with CSF positive. Prior treatment with Avonex, Tysabri, Tecfidera, Avenox, Plegridy, and Gilenya by her Rheumatologist with no improvement. Currently treated with Aubagio. She has also tried Keppra, gabapentin, Vicodin, flexeril, and baclofen. Lyrica denied by her insurance. Ms. Óscar Schmid continues unchanged since last visit. She has been doing well with her current treatment plan which has been offering moderate pain control. We adjusted her oxycodone last visit up to 7.5/325 mg with good improvement. She does report that she continues with her oxycodone and Flexeril on an as-needed basis only which has been effective. Unfortunately her physician Dr. Mike Essex, has left our practice. She says that she would like to continue with our practice if able. I will have her follow-up in 3 months for further evaluation and recommendation. Current medication management consists of Percocet, 7.5/325, up to 2 times daily PRN. Medications are helping with pain control and quality of life. Her pain is 6-7/10 with medication and 9/10 without. Pt describes pain as sharp and muscle spasms. Pain may affect her walking due to muscle spasms. Aggravating factors include standing and walking. Relieved with rest, medication, and avoiding painful activities. Current treatment is helping to improve general activity, mood, walking, sleep, enjoyment of life    In the past 30 days, the patient reports approximately 40% pain relief with current treatment/medications. Pt does report constipation but is well controlled with diet modifications  She  is otherwise doing well with no other complaints today.  She denies any adverse events including nausea, vomiting, dizziness, increased constipation, hallucinations, or seizures. Allergies   Allergen Reactions    Mushroom Flavor Other (comments)     Mushroom,    Olive Extract Other (comments)    Pcn [Penicillins] Unknown (comments)    Shellfish Derived Other (comments)       Past Surgical History:   Procedure Laterality Date    HX  SECTION      x1    HX GYN  2002    tubal ligation    HX HYSTERECTOMY  12         Review of Systems   Constitutional: Positive for malaise/fatigue. Negative for chills, fever and weight loss. HENT: Negative for congestion and sore throat. Eyes: Negative for blurred vision and double vision. Respiratory: Negative for cough, shortness of breath and wheezing. Cardiovascular: Negative for chest pain and palpitations. Gastrointestinal: Positive for constipation, heartburn and nausea. Negative for abdominal pain, diarrhea and vomiting. Genitourinary: Negative. Musculoskeletal: Positive for back pain, joint pain, myalgias and neck pain. Neurological: Positive for weakness. Negative for dizziness, seizures, loss of consciousness and headaches. Endo/Heme/Allergies: Does not bruise/bleed easily. Psychiatric/Behavioral: Negative for depression. The patient is not nervous/anxious and does not have insomnia. All other systems reviewed and are negative. Physical Exam   Constitutional: She is oriented to person, place, and time and well-developed, well-nourished, and in no distress. No distress. HENT:   Head: Normocephalic and atraumatic. Eyes: EOM are normal.   Pulmonary/Chest: Effort normal.   Neurological: She is alert and oriented to person, place, and time. Skin: Skin is warm and dry. No rash noted. She is not diaphoretic. No erythema. Psychiatric: Mood, memory, affect and judgment normal.   Nursing note and vitals reviewed. ASSESSMENT:    1. MS (multiple sclerosis) ([facility]    2. Central pain syndrome    3.  Chronic pain syndrome           [place] Prescription Monitoring Program was reviewed which does not demonstrate aberrancies and/or inconsistencies with regard to the historical prescribing of controlled medications to this patient by other providers. PLAN / Pt Instructions:  1. Continue current plan with no evidence of addiction or diversion. Stable on current medication without adverse events. 2. Refill oxycodone 7.5/325 mg 2 times daily as needed. 3. Continue Zanaflex 4 mg up to 3 times daily as needed for muscle spasm/pain. 4. Discussed risks of addiction, dependency, and opioid induced hyperalgesia. 5. Return to clinic in 3 months    Medications Ordered Today   Medications    oxyCODONE-acetaminophen (PERCOCET 7.5) 7.5-325 mg per tablet     Sig: Take 1 Tab by mouth two (2) times daily as needed for Pain for up to 30 days. Max Daily Amount: 2 Tabs. Dispense:  60 Tab     Refill:  0    DISCONTD: oxyCODONE-acetaminophen (PERCOCET 7.5) 7.5-325 mg per tablet     Sig: Take 1 Tab by mouth two (2) times daily as needed for Pain for up to 30 days. Max Daily Amount: 2 Tabs. Dispense:  60 Tab     Refill:  0    DISCONTD: oxyCODONE-acetaminophen (PERCOCET) 5-325 mg per tablet     Sig: Take 1 Tab by mouth two (2) times daily as needed for Pain for up to 30 days. Max Daily Amount: 2 Tabs. Indications: Pain     Dispense:  60 Tab     Refill:  0    oxyCODONE-acetaminophen (PERCOCET 7.5) 7.5-325 mg per tablet     Sig: Take 1 Tab by mouth two (2) times daily as needed for Pain for up to 30 days. Max Daily Amount: 2 Tabs. Dispense:  60 Tab     Refill:  0       Pain medications prescribed with the objective of pain relief and improved physical and psychosocial function in this patient. Spent 25 minutes with patient today reviewing the treatment plan, goals of treatment plan, and limitations of the treatment plan, to include the potential for side effects from medications and procedures. Jhon Recio 11/9/2017      Note: Please excuse any typographical errors.  Voice recognition software was used for this note and may cause mistakes.

## 2017-12-20 ENCOUNTER — APPOINTMENT (OUTPATIENT)
Dept: NUTRITION | Age: 47
End: 2017-12-20

## 2018-02-06 ENCOUNTER — OFFICE VISIT (OUTPATIENT)
Dept: PAIN MANAGEMENT | Age: 48
End: 2018-02-06

## 2018-02-06 VITALS
TEMPERATURE: 97.3 F | HEIGHT: 68 IN | DIASTOLIC BLOOD PRESSURE: 78 MMHG | SYSTOLIC BLOOD PRESSURE: 137 MMHG | BODY MASS INDEX: 37.13 KG/M2 | WEIGHT: 245 LBS | RESPIRATION RATE: 14 BRPM | HEART RATE: 72 BPM

## 2018-02-06 DIAGNOSIS — G89.0 CENTRAL PAIN SYNDROME: ICD-10-CM

## 2018-02-06 DIAGNOSIS — G89.4 CHRONIC PAIN SYNDROME: ICD-10-CM

## 2018-02-06 DIAGNOSIS — G35 MS (MULTIPLE SCLEROSIS) (HCC): ICD-10-CM

## 2018-02-06 RX ORDER — OXYCODONE AND ACETAMINOPHEN 5; 325 MG/1; MG/1
1 TABLET ORAL
Qty: 60 TAB | Refills: 0 | Status: SHIPPED | OUTPATIENT
Start: 2018-02-06 | End: 2018-03-08

## 2018-02-06 RX ORDER — OXYCODONE AND ACETAMINOPHEN 7.5; 325 MG/1; MG/1
1 TABLET ORAL
Qty: 60 TAB | Refills: 0 | Status: SHIPPED | OUTPATIENT
Start: 2018-04-06 | End: 2018-05-07 | Stop reason: SDUPTHER

## 2018-02-06 RX ORDER — OXYCODONE AND ACETAMINOPHEN 7.5; 325 MG/1; MG/1
1 TABLET ORAL
Qty: 60 TAB | Refills: 0 | Status: SHIPPED | OUTPATIENT
Start: 2018-03-07 | End: 2018-04-06

## 2018-02-06 NOTE — PROGRESS NOTES
HISTORY OF PRESENT ILLNESS  Danette Simpson is a 50 y.o. female    HPI: Ms. Agustin Melara  returns today for f/u of chronic, severe pain related to underlying multiple sclerosis. She was diagnosed with multiple sclerosis in 2007 with CSF positive. Prior treatment with Avonex, Tysabri, Tecfidera, Avenox, Plegridy, and Gilenya by her Rheumatologist with no improvement. Currently treated with Aubagio. She has also tried Keppra, gabapentin, Vicodin, flexeril, and baclofen. Lyrica denied by her insurance. Ms. Agustni Melara continues unchanged since last visit. She has been doing well with her current treatment plan which has been offering moderate pain control. She continues to use her oxycodone very sparingly on an as-needed basis. She reports no new complaints today. I will have her follow-up in 3 months or sooner if needed    Current medication management consists of Percocet, 7.5/325, up to 2 times daily PRN. Medications are helping with pain control and quality of life. Her pain is 6-7/10 with medication and 9/10 without. Pt describes pain as sharp and muscle spasms. Pain may affect her walking due to muscle spasms. Aggravating factors include standing and walking. Relieved with rest, medication, and avoiding painful activities. Current treatment is helping to improve general activity, mood, walking, sleep, enjoyment of life    In the past 30 days, the patient reports approximately 40% pain relief with current treatment/medications. Pt does report constipation but is well controlled with diet modifications  She  is otherwise doing well with no other complaints today. She denies any adverse events including nausea, vomiting, dizziness, increased constipation, hallucinations, or seizures.             Allergies   Allergen Reactions    Mushroom Flavor Other (comments)     Mushroom,    Olive Extract Other (comments)    Pcn [Penicillins] Unknown (comments)    Shellfish Derived Other (comments)       Past Surgical History:   Procedure Laterality Date    HX  SECTION      x1    HX GYN  2002    tubal ligation    HX HYSTERECTOMY  12         Review of Systems   Constitutional: Positive for malaise/fatigue. Negative for chills, fever and weight loss. HENT: Negative for congestion and sore throat. Eyes: Negative for blurred vision and double vision. Respiratory: Negative for cough, shortness of breath and wheezing. Cardiovascular: Negative for chest pain and palpitations. Gastrointestinal: Positive for constipation, heartburn and nausea. Negative for abdominal pain, diarrhea and vomiting. Genitourinary: Negative. Musculoskeletal: Positive for back pain, joint pain, myalgias and neck pain. Neurological: Positive for weakness. Negative for dizziness, seizures, loss of consciousness and headaches. Endo/Heme/Allergies: Does not bruise/bleed easily. Psychiatric/Behavioral: Negative for depression. The patient is not nervous/anxious and does not have insomnia. All other systems reviewed and are negative. Physical Exam   Constitutional: She is oriented to person, place, and time and well-developed, well-nourished, and in no distress. No distress. HENT:   Head: Normocephalic and atraumatic. Eyes: EOM are normal.   Pulmonary/Chest: Effort normal.   Neurological: She is alert and oriented to person, place, and time. Skin: Skin is warm and dry. No rash noted. She is not diaphoretic. No erythema. Psychiatric: Mood, memory, affect and judgment normal.   Nursing note and vitals reviewed. ASSESSMENT:    1. Central pain syndrome    2. Chronic pain syndrome    3. MS (multiple sclerosis) [facility] Prescription Monitoring Program was reviewed which does not demonstrate aberrancies and/or inconsistencies with regard to the historical prescribing of controlled medications to this patient by other providers. PLAN / Pt Instructions:  1.  Continue current plan with no evidence of addiction or diversion. Stable on current medication without adverse events. 2. Refill oxycodone 7.5/325 mg 2 times daily as needed. 3. Continue Zanaflex 4 mg up to 3 times daily as needed for muscle spasm/pain. 4. Discussed risks of addiction, dependency, and opioid induced hyperalgesia. 5. Return to clinic in 3 months    Medications Ordered Today   Medications    oxyCODONE-acetaminophen (PERCOCET) 5-325 mg per tablet     Sig: Take 1 Tab by mouth two (2) times daily as needed for Pain for up to 30 days. Max Daily Amount: 2 Tabs. Indications: Pain     Dispense:  60 Tab     Refill:  0    oxyCODONE-acetaminophen (PERCOCET 7.5) 7.5-325 mg per tablet     Sig: Take 1 Tab by mouth two (2) times daily as needed for Pain for up to 30 days. Max Daily Amount: 2 Tabs. Dispense:  60 Tab     Refill:  0    oxyCODONE-acetaminophen (PERCOCET 7.5) 7.5-325 mg per tablet     Sig: Take 1 Tab by mouth two (2) times daily as needed for Pain for up to 30 days. Max Daily Amount: 2 Tabs. Dispense:  60 Tab     Refill:  0       Pain medications prescribed with the objective of pain relief and improved physical and psychosocial function in this patient. Spent 25 minutes with patient today reviewing the treatment plan, goals of treatment plan, and limitations of the treatment plan, to include the potential for side effects from medications and procedures. [facility] 2/6/2018      Note: Please excuse any typographical errors. Voice recognition software was used for this note and may cause mistakes.

## 2018-02-06 NOTE — PROGRESS NOTES
Nursing Notes    Patient presents to the office today in follow-up. Patient rates her pain at 7/10 on the numerical pain scale. Reviewed medications with counts as follows:    Rx Date filled Qty Dispensed Pill Count Last Dose Short   Percocet 7.5/325 mg 12/19/17 60 10 02/02/18 no                                      POC UDS was not performed in office today    Any new labs or imaging since last appointment? NO    Have you been to an emergency room (ER) or urgent care clinic since your last visit? NO            Have you been hospitalized since your last visit? NO     If yes, where, when, and reason for visit? Have you seen or consulted any other health care providers outside of the [address] [place] [address]  since your last visit? NO     If yes, where, when, and reason for visit? HM deferred to pcp.

## 2018-02-06 NOTE — MR AVS SNAPSHOT
[address] [address] 
[phone] Patient: Hallie Simpson MRN: [id number] :1970 Visit Information Date & Time Provider Department Dept. Phone Encounter #  
 2018  8:40 [facility] [phone] [id number] Follow-up Instructions Return in about 3 months (around 2018). Upcoming Health Maintenance Date Due DTaP/Tdap/Td series (1 - Tdap) 1989 PAP AKA CERVICAL CYTOLOGY 1989 Influenza Age 5 to Adult 2017 Allergies as of 2018  Review Complete On: 2018 By: Lorna Miller LPN Severity Noted Reaction Type Reactions Mushroom Flavor  2015    Other (comments) Mushroom, Olive Extract  2015    Other (comments) Pcn [Penicillins]  12/15/2015    Unknown (comments) Shellfish Derived  2015    Other (comments) Current Immunizations  Never Reviewed No immunizations on file. Not reviewed this visit You Were Diagnosed With   
  
 Codes Comments Central pain syndrome     ICD-10-CM: G89.0 ICD-9-CM: 338.0 Chronic pain syndrome     ICD-10-CM: G89.4 ICD-9-CM: 338.4 MS (multiple sclerosis) ([facility]     ICD-10-CM: G35 
ICD-9-CM: 903 Vitals BP Pulse Temp Resp Height(growth percentile) Weight(growth percentile)  
 137/78 (BP 1 Location: Left arm, BP Patient Position: Sitting) 72 97.3 °F (36.3 °C) (Oral) 14 5' 8\" (1.727 m) 245 lb (111.1 kg) BMI OB Status Smoking Status 37.25 kg/m2 Hysterectomy Former Smoker Vitals History BMI and BSA Data Body Mass Index Body Surface Area  
 37.25 kg/m 2 2.31 m 2 Preferred Pharmacy Pharmacy Name Phone [phone] - [address], [address] [facility] [address] [address] AT [address] [phone] Your Updated Medication List  
  
   
 This list is accurate as of: 2/6/18  8:53 AM.  Always use your most recent med list.  
  
  
  
  
 AUBAGIO 14 mg Tab Generic drug:  teriflunomide Take 14 mg by mouth daily. EPINEPHrine 0.3 mg/0.3 mL injection Commonly known as:  EPIPEN  
0.3 mL by IntraMUSCular route once as needed for up to 1 dose. levETIRAcetam 250 mg tablet Commonly known as:  KEPPRA Take  by mouth two (2) times a day. lisinopril 10 mg tablet Commonly known as:  Rennis Douse Take 10 mg by mouth daily. ondansetron 4 mg disintegrating tablet Commonly known as:  ZOFRAN ODT Take 1 Tab by mouth every eight (8) hours as needed for Nausea. * oxyCODONE-acetaminophen 5-325 mg per tablet Commonly known as:  PERCOCET Take 1 Tab by mouth two (2) times daily as needed for Pain for up to 30 days. Max Daily Amount: 2 Tabs. Indications: Pain * oxyCODONE-acetaminophen 7.5-325 mg per tablet Commonly known as:  PERCOCET 7.5 Take 1 Tab by mouth two (2) times daily as needed for Pain for up to 30 days. Max Daily Amount: 2 Tabs. Start taking on:  3/7/2018 * oxyCODONE-acetaminophen 7.5-325 mg per tablet Commonly known as:  PERCOCET 7.5 Take 1 Tab by mouth two (2) times daily as needed for Pain for up to 30 days. Max Daily Amount: 2 Tabs. Start taking on:  4/6/2018 PriLOSEC 20 mg capsule Generic drug:  omeprazole Take 20 mg by mouth daily. simvastatin 20 mg tablet Commonly known as:  ZOCOR Take  by mouth nightly. VITAMIN D2 50,000 unit capsule Generic drug:  ergocalciferol Take 50,000 Units by mouth every seven (7) days. * Notice: This list has 3 medication(s) that are the same as other medications prescribed for you. Read the directions carefully, and ask your doctor or other care provider to review them with you. Prescriptions Printed  Refills  
 oxyCODONE-acetaminophen (PERCOCET) 5-325 mg per tablet 0  
 Sig: Take 1 Tab by mouth two (2) times daily as needed for Pain for up to 30 days. Max Daily Amount: 2 Tabs. Indications: Pain Class: Print Route: Oral  
 oxyCODONE-acetaminophen (PERCOCET 7.5) 7.5-325 mg per tablet 0 Starting on: 3/7/2018 Sig: Take 1 Tab by mouth two (2) times daily as needed for Pain for up to 30 days. Max Daily Amount: 2 Tabs. Class: Print Route: Oral  
 oxyCODONE-acetaminophen (PERCOCET 7.5) 7.5-325 mg per tablet 0 Starting on: 4/6/2018 Sig: Take 1 Tab by mouth two (2) times daily as needed for Pain for up to 30 days. Max Daily Amount: 2 Tabs. Class: Print Route: Oral  
  
Follow-up Instructions Return in about 3 months (around 5/6/2018). Patient Instructions 1. Continue current plan with no evidence of addiction or diversion. Stable on current medication without adverse events. 2. Refill oxycodone 7.5/325 mg 2 times daily as needed. 3. Continue Zanaflex 4 mg up to 3 times daily as needed for muscle spasm/pain. 4. Discussed risks of addiction, dependency, and opioid induced hyperalgesia. 5. Return to clinic in 3 months Introducing [facility] & HEALTH SERVICES! [employer] introduces [employer] patient portal. Now you can access parts of your medical record, email your doctor's office, and request medication refills online. 1. In your internet browser, go to [email or web address] 2. Click on the First Time User? Click Here link in the Sign In box. You will see the New Member Sign Up page. 3. Enter your [employer] Access Code exactly as it appears below. You will not need to use this code after youve completed the sign-up process. If you do not sign up before the expiration date, you must request a new code. · [employer] Access Code: [email or web address] Expires: 3/13/2018  2:12 PM 
 
4. Enter the last four digits of your Social Security Number (xxxx) and Date of Birth (mm/dd/yyyy) as indicated and click Submit.  You will be taken to the next sign-up page. 5. Create a [employer] ID. This will be your [employer] login ID and cannot be changed, so think of one that is secure and easy to remember. 6. Create a [employer] password. You can change your password at any time. 7. Enter your Password Reset Question and Answer. This can be used at a later time if you forget your password. 8. Enter your e-mail address. You will receive e-mail notification when new information is available in [address] 9. Click Sign Up. You can now view and download portions of your medical record. 10. Click the Download Summary menu link to download a portable copy of your medical information. If you have questions, please visit the Frequently Asked Questions section of the [employer] website. Remember, [employer] is NOT to be used for urgent needs. For medical emergencies, dial 911. Now available from your iPhone and Android! Please provide this summary of care documentation to your next provider. Your primary care clinician is listed as Carroll Ennis Ii. If you have any questions after today's visit, please call [phone].

## 2018-05-07 ENCOUNTER — OFFICE VISIT (OUTPATIENT)
Dept: PAIN MANAGEMENT | Age: 48
End: 2018-05-07

## 2018-05-07 VITALS
SYSTOLIC BLOOD PRESSURE: 122 MMHG | HEART RATE: 71 BPM | BODY MASS INDEX: 36.07 KG/M2 | DIASTOLIC BLOOD PRESSURE: 75 MMHG | RESPIRATION RATE: 14 BRPM | TEMPERATURE: 97.8 F | HEIGHT: 68 IN | WEIGHT: 238 LBS

## 2018-05-07 DIAGNOSIS — G35 MS (MULTIPLE SCLEROSIS) (HCC): ICD-10-CM

## 2018-05-07 DIAGNOSIS — G89.0 CENTRAL PAIN SYNDROME: ICD-10-CM

## 2018-05-07 DIAGNOSIS — G89.4 CHRONIC PAIN SYNDROME: Primary | ICD-10-CM

## 2018-05-07 DIAGNOSIS — Z79.899 ENCOUNTER FOR LONG-TERM (CURRENT) USE OF HIGH-RISK MEDICATION: ICD-10-CM

## 2018-05-07 LAB
ALCOHOL UR POC: NORMAL
AMPHETAMINES UR POC: NEGATIVE
BARBITURATES UR POC: NORMAL
BENZODIAZEPINES UR POC: NEGATIVE
BUPRENORPHINE UR POC: NEGATIVE
CANNABINOIDS UR POC: NEGATIVE
CARISOPRODOL UR POC: NORMAL
COCAINE UR POC: NEGATIVE
FENTANYL UR POC: NORMAL
MDMA/ECSTASY UR POC: NORMAL
METHADONE UR POC: NEGATIVE
METHAMPHETAMINE UR POC: NORMAL
METHYLPHENIDATE UR POC: NORMAL
OPIATES UR POC: NEGATIVE
OXYCODONE UR POC: NORMAL
PHENCYCLIDINE UR POC: NORMAL
PROPOXYPHENE UR POC: NORMAL
TRAMADOL UR POC: NORMAL
TRICYCLICS UR POC: NORMAL

## 2018-05-07 RX ORDER — TIZANIDINE 4 MG/1
4 TABLET ORAL 3 TIMES DAILY
Qty: 90 TAB | Refills: 3 | Status: SHIPPED | OUTPATIENT
Start: 2018-05-07 | End: 2018-06-06

## 2018-05-07 RX ORDER — OXYCODONE AND ACETAMINOPHEN 7.5; 325 MG/1; MG/1
1 TABLET ORAL
Qty: 60 TAB | Refills: 0 | Status: SHIPPED | OUTPATIENT
Start: 2018-05-21 | End: 2018-06-12 | Stop reason: SDUPTHER

## 2018-05-07 NOTE — MR AVS SNAPSHOT
[address] [id number] 
[phone] Patient: Vita Simpson MRN: [id number] :1970 Visit Information Date & Time Provider Department Dept. Phone Encounter #  
 2018  8:40 AM Bryon Schaefer [address] [address] for [facility] Management 94 [phone] Upcoming Health Maintenance Date Due DTaP/Tdap/Td series (1 - Tdap) 1989 PAP AKA CERVICAL CYTOLOGY 1989 Influenza Age 5 to Adult 2018 Allergies as of 2018  Review Complete On: 2018 By: SUAD Okeefe Severity Noted Reaction Type Reactions Mushroom Flavor  2015    Other (comments) Mushroom, Olive Extract  2015    Other (comments) Pcn [Penicillins]  12/15/2015    Unknown (comments) Shellfish Derived  2015    Other (comments) Current Immunizations  Never Reviewed No immunizations on file. Not reviewed this visit You Were Diagnosed With   
  
 Codes Comments Chronic pain syndrome    -  Primary ICD-10-CM: G89.4 ICD-9-CM: 338.4 MS (multiple sclerosis) ([facility]     ICD-10-CM: G35 
ICD-9-CM: 886 Encounter for long-term (current) use of high-risk medication     ICD-10-CM: Z79.899 ICD-9-CM: V58.69 Central pain syndrome     ICD-10-CM: G89.0 ICD-9-CM: 338.0 Vitals BP Pulse Temp Resp Height(growth percentile) Weight(growth percentile) 122/75 (BP 1 Location: Right arm, BP Patient Position: Sitting) 71 97.8 °F (36.6 °C) 14 5' 8\" (1.727 m) 238 lb (108 kg) BMI OB Status Smoking Status 36.19 kg/m2 Hysterectomy Former Smoker BMI and BSA Data Body Mass Index Body Surface Area  
 36.19 kg/m 2 2.28 m 2 Preferred Pharmacy Pharmacy Name Phone [phone] - [address], [address] [facility] [address] AT [address] [phone] Your Updated Medication List  
  
   
 This list is accurate as of 5/7/18  9:43 AM.  Always use your most recent med list.  
  
  
  
  
 AUBAGIO 14 mg Tab Generic drug:  teriflunomide Take 14 mg by mouth daily. EPINEPHrine 0.3 mg/0.3 mL injection Commonly known as:  EPIPEN  
0.3 mL by IntraMUSCular route once as needed for up to 1 dose. levETIRAcetam 250 mg tablet Commonly known as:  KEPPRA Take  by mouth two (2) times a day. lisinopril 10 mg tablet Commonly known as:  Shreya [place] Take 10 mg by mouth daily. ondansetron 4 mg disintegrating tablet Commonly known as:  ZOFRAN ODT Take 1 Tab by mouth every eight (8) hours as needed for Nausea. oxyCODONE-acetaminophen 7.5-325 mg per tablet Commonly known as:  PERCOCET 7.5 Take 1 Tab by mouth two (2) times daily as needed for Pain for up to 30 days. Max Daily Amount: 2 Tabs. Start taking on:  5/21/2018 PriLOSEC 20 mg capsule Generic drug:  omeprazole Take 20 mg by mouth daily. simvastatin 20 mg tablet Commonly known as:  ZOCOR Take  by mouth nightly. VITAMIN D2 50,000 unit capsule Generic drug:  ergocalciferol Take 50,000 Units by mouth every seven (7) days. Prescriptions Printed Refills  
 oxyCODONE-acetaminophen (PERCOCET 7.5) 7.5-325 mg per tablet 0 Starting on: 5/21/2018 Sig: Take 1 Tab by mouth two (2) times daily as needed for Pain for up to 30 days. Max Daily Amount: 2 Tabs. Class: Print Route: Oral  
  
We Performed the Following AMB POC DRUG SCREEN () [ [facility]] DRUG SCREEN [YMR06078 Custom] Introducing [facility] & HEALTH SERVICES! Yazmin Carl introduces [employer] patient portal. Now you can access parts of your medical record, email your doctor's office, and request medication refills online. 1. In your internet browser, go to [email or web address] 2. Click on the First Time User? Click Here link in the Sign In box.  You will see the New Member Sign Up page. 3. Enter your [employer] Access Code exactly as it appears below. You will not need to use this code after youve completed the sign-up process. If you do not sign up before the expiration date, you must request a new code. · [employer] Access Code: [email or web address] Expires: 8/5/2018  9:43 AM 
 
4. Enter the last four digits of your Social Security Number (xxxx) and Date of Birth (mm/dd/yyyy) as indicated and click Submit. You will be taken to the next sign-up page. 5. Create a [employer] ID. This will be your [employer] login ID and cannot be changed, so think of one that is secure and easy to remember. 6. Create a [employer] password. You can change your password at any time. 7. Enter your Password Reset Question and Answer. This can be used at a later time if you forget your password. 8. Enter your e-mail address. You will receive e-mail notification when new information is available in [address] 9. Click Sign Up. You can now view and download portions of your medical record. 10. Click the Download Summary menu link to download a portable copy of your medical information. If you have questions, please visit the Frequently Asked Questions section of the [employer] website. Remember, [employer] is NOT to be used for urgent needs. For medical emergencies, dial 911. Now available from your iPhone and Android! Please provide this summary of care documentation to your next provider. Your primary care clinician is listed as Tera Batista Ii. If you have any questions after today's visit, please call [phone].

## 2018-05-07 NOTE — PATIENT INSTRUCTIONS
1. Continue current plan with no evidence of addiction or diversion. Stable on current medication without adverse events. 2. Refill oxycodone 7.5/325 mg 2 times daily as needed. 3. refill Zanaflex 4 mg up to 3 times daily as needed for muscle spasm/pain. 3 refills  4. Discussed risks of addiction, dependency, and opioid induced hyperalgesia. 5. Please remember to call at least 3-4 business days prior to medication refill. 6. Return to clinic in 3 months.   Please remember to call and cancel your appointment and your pain management agreement if you decide to transfer your care

## 2018-05-07 NOTE — PROGRESS NOTES
HISTORY OF PRESENT ILLNESS  Danette Simpson is a 50 y.o. female    HPI: Ms. Jhoan Bettencourt  returns today for f/u of chronic, severe pain related to underlying multiple sclerosis. She was diagnosed with multiple sclerosis in 2007 with CSF positive. Prior treatment with Avonex, Tysabri, Tecfidera, Avenox, Plegridy, and Gilenya by her Rheumatologist with no improvement. Currently treated with Aubagio. She has also tried Keppra, gabapentin, Vicodin, flexeril, and baclofen. Lyrica denied by her insurance. Ms. Jhoan Bettencourt continues to do well with her current treatment plan which has been offering moderate pain control. She does report a new acute injury since her last visit. She reports that she fell down some stairs in March injuring her back. She was seen at [facility] where she received oxycodone 10/325 mg filled on 3/22/2018. She reports that she did not take any of these medication and has her medication with her today. Extensive counseling was provided. I explained to her that she must disclose this medication to our office within 72 hours. She reports that she was not aware to call the medication and thought that she was just supposed to bring it with her to her office visit. We had a lengthy conversation about the departure of her previous providers who recently left our practice. I explained to her that moving forward we will be focusing on a more conservative and non-opioid plan of care. This will result in changes to her treatment. She has expressed interest in transitioning her care but has not made a definitive decision at this time. I have asked that she call and cancel her appointment and pain management agreement if she does decide to transition her care. We did discuss some options today will discuss further options moving forward. She is otherwise doing well with no other complaints today. I will have her follow-up in 3 months or sooner if needed.     Current medication management consists of Percocet, 7.5/325, up to 2 times daily PRN. Medications are helping with pain control and quality of life. Her pain is 6-7/10 with medication and 9/10 without. Pt describes pain as sharp and muscle spasms. Pain may affect her walking due to muscle spasms. Aggravating factors include standing and walking. Relieved with rest, medication, and avoiding painful activities. Current treatment is helping to improve general activity, mood, walking, sleep, enjoyment of life    In the past 30 days, the patient reports approximately 40% pain relief with current treatment/medications. Pt does report constipation but is well controlled with diet modifications  She  is otherwise doing well with no other complaints today. She denies any adverse events including nausea, vomiting, dizziness, increased constipation, hallucinations, or seizures. POC UDS today. Confirmation pending. Allergies   Allergen Reactions    Mushroom Flavor Other (comments)     Mushroom,    Olive Extract Other (comments)    Pcn [Penicillins] Unknown (comments)    Shellfish Derived Other (comments)       Past Surgical History:   Procedure Laterality Date    HX  SECTION      x1    HX GYN  2002    tubal ligation    HX HYSTERECTOMY  14         Review of Systems   Constitutional: Positive for malaise/fatigue. Negative for chills, fever and weight loss. HENT: Negative for congestion and sore throat. Eyes: Negative for blurred vision and double vision. Respiratory: Negative for cough, shortness of breath and wheezing. Cardiovascular: Negative for chest pain and palpitations. Gastrointestinal: Positive for constipation, heartburn and nausea. Negative for abdominal pain, diarrhea and vomiting. Genitourinary: Negative. Musculoskeletal: Positive for back pain, joint pain, myalgias and neck pain. Neurological: Positive for weakness. Negative for dizziness, seizures, loss of consciousness and headaches. Endo/Heme/Allergies: Does not bruise/bleed easily. Psychiatric/Behavioral: Negative for depression. The patient is not nervous/anxious and does not have insomnia. All other systems reviewed and are negative. Physical Exam   Constitutional: She is oriented to person, place, and time and well-developed, well-nourished, and in no distress. No distress. HENT:   Head: Normocephalic and atraumatic. Eyes: EOM are normal.   Pulmonary/Chest: Effort normal.   Neurological: She is alert and oriented to person, place, and time. Skin: Skin is warm and dry. No rash noted. She is not diaphoretic. No erythema. Psychiatric: Mood, memory, affect and judgment normal.   Nursing note and vitals reviewed. ASSESSMENT:    1. Chronic pain syndrome    2. MS (multiple sclerosis) ([facility]    3. Encounter for long-term (current) use of high-risk medication    4. Central pain syndrome           [place] Prescription Monitoring Program was reviewed which DOES demonstrate aberrancies and/or inconsistencies with regard to the historical prescribing of controlled medications to this patient by other providers. NOTE: Undisclosed oxycodone filled 3/22/2018 for acute injury. She was seen at [facility] and has her medications with her at office visit 5/7/2018. Extensive counseling was provided. PLAN / Pt Instructions:  1. Continue current plan with no evidence of addiction or diversion. Stable on current medication without adverse events. 2. Refill oxycodone 7.5/325 mg 2 times daily as needed. 3. refill Zanaflex 4 mg up to 3 times daily as needed for muscle spasm/pain. 3 refills  4. Discussed risks of addiction, dependency, and opioid induced hyperalgesia. 5. Please remember to call at least 3-4 business days prior to medication refill. 6. Return to clinic in 3 months.   Please remember to call and cancel your appointment and your pain management agreement if you decide to transfer your care      Medications Ordered Today   Medications    oxyCODONE-acetaminophen (PERCOCET 7.5) 7.5-325 mg per tablet     Sig: Take 1 Tab by mouth two (2) times daily as needed for Pain for up to 30 days. Max Daily Amount: 2 Tabs. Dispense:  60 Tab     Refill:  0    tiZANidine (ZANAFLEX) 4 mg tablet     Sig: Take 1 Tab by mouth three (3) times daily for 30 days. Dispense:  90 Tab     Refill:  3       Pain medications prescribed with the objective of pain relief and improved physical and psychosocial function in this patient. Spent 25 minutes with patient today reviewing the treatment plan, goals of treatment plan, and limitations of the treatment plan, to include the potential for side effects from medications and procedures. Jhon Byrd 5/7/2018      Note: Please excuse any typographical errors. Voice recognition software was used for this note and may cause mistakes.

## 2018-05-07 NOTE — PROGRESS NOTES
Nursing Notes    Patient presents to the office today in follow-up. Patient rates her pain at 6/10 on the numerical pain scale. Reviewed medications with counts as follows:    Rx Date filled Qty Dispensed Pill Count Last Dose Short   Percocet 7.5 mg 04/22/18 60 37 Sat pm no         Comments: Patient is here today for a follow up appt today she states her pain level today is a 6  She states she fell and hurt her back and went to the ER at [facility] and a ct scan was done. She states she was given meds for the pain  She states she did not know she had to call and inform us, she states she thought she had to bring them in at the next visit. POC UDS was performed in office today per verbal order per [facility]    Any new labs or imaging since last appointment? YES cr scan was done     Have you been to an emergency room (ER) or urgent care clinic since your last visit? YES            Have you been hospitalized since your last visit? NO     If yes, where, when, and reason for visit? Have you seen or consulted any other health care providers outside of the [address] [place] [address]  since your last visit? YES     If yes, where, when, and reason for visit? Ms. Erin Knapp has a reminder for a \"due or due soon\" health maintenance. I have asked that she contact her primary care provider for follow-up on this health maintenance.

## 2018-06-11 ENCOUNTER — TELEPHONE (OUTPATIENT)
Dept: PAIN MANAGEMENT | Age: 48
End: 2018-06-11

## 2018-06-11 NOTE — TELEPHONE ENCOUNTER
Pt called re refill on percocet. Returned pt's call and explained it is too soon to issue a new script for the percocet - not due until 6/22/18. Asked pt to call back on or about the 18th to request a refill. Pt understood - will call back.

## 2018-06-12 DIAGNOSIS — G89.4 CHRONIC PAIN SYNDROME: ICD-10-CM

## 2018-06-12 NOTE — TELEPHONE ENCOUNTER
Danette Simpson has called requesting a refill of their controlled medication, percocet 7.5/325mg, for the management of chronic generalized pain relief. Last office visit date: 05/07/18    Date last  was pulled and reviewed : 06/12/18    Was the patient compliant when the above report was pulled? yes    Analgesia: 70% pain relief noted     Aberrancies: none noted    ADL's: patient is able complete adl care    Adverse Reaction: none noted      Provider's last note and plan of care reviewed? yes  Request forwarded to provider for review.

## 2018-06-13 RX ORDER — OXYCODONE AND ACETAMINOPHEN 7.5; 325 MG/1; MG/1
1 TABLET ORAL
Qty: 60 TAB | Refills: 0 | Status: SHIPPED | OUTPATIENT
Start: 2018-06-21 | End: 2018-07-30 | Stop reason: SDUPTHER

## 2018-06-15 ENCOUNTER — TELEPHONE (OUTPATIENT)
Dept: PAIN MANAGEMENT | Age: 48
End: 2018-06-15

## 2018-06-27 ENCOUNTER — OFFICE VISIT (OUTPATIENT)
Dept: SURGERY | Age: 48
End: 2018-06-27

## 2018-06-27 VITALS
WEIGHT: 235 LBS | RESPIRATION RATE: 18 BRPM | HEART RATE: 74 BPM | TEMPERATURE: 98.6 F | SYSTOLIC BLOOD PRESSURE: 130 MMHG | DIASTOLIC BLOOD PRESSURE: 70 MMHG | BODY MASS INDEX: 35.61 KG/M2 | HEIGHT: 68 IN

## 2018-06-27 DIAGNOSIS — G35 MS (MULTIPLE SCLEROSIS) (HCC): ICD-10-CM

## 2018-06-27 DIAGNOSIS — E66.01 MORBID OBESITY (HCC): Primary | ICD-10-CM

## 2018-06-27 DIAGNOSIS — I10 ESSENTIAL HYPERTENSION: ICD-10-CM

## 2018-06-27 DIAGNOSIS — E78.2 MIXED HYPERLIPIDEMIA: ICD-10-CM

## 2018-06-27 DIAGNOSIS — K21.9 GASTROESOPHAGEAL REFLUX DISEASE WITHOUT ESOPHAGITIS: ICD-10-CM

## 2018-06-27 RX ORDER — GLUCOSAMINE SULFATE 1500 MG
POWDER IN PACKET (EA) ORAL DAILY
COMMUNITY
End: 2021-12-17

## 2018-06-27 NOTE — PROGRESS NOTES
Initial Consultation for Bariatric Surgery Template (Gastric Sleeve)    [de-identified] R Calvin is a 50 y.o. female who comes into the office today for initial consultation for the surgical options for the treatment of morbid obesity. The patient initially identified obesity at the age of 50 and at age 23 weighed 160 lbs. She has tried a variety of unsupervised weight-loss attempts including self imposed and Atkins, but has yet to meet with lasting success. Maximum weight lost on a diet is about 14 lbs, but that the weight loss always seems to return. Today, the patient is  Height: 5' 8\" (172.7 cm) tall, Weight: 106.6 kg (235 lb) lbs for a Body mass index is 35.73 kg/(m^2). It is due to the patient's severe obesity, which is further complicated by hypertension, hyperlipidemia, GERD, obstructive sleep apnea - clinical and multple sclerosis  that the patient is now seeking out bariatric surgery, specifically, gastric bypass. Past Medical History:   Diagnosis Date    Depression     Hypertension     Insomnia     Migraines     MS (multiple sclerosis) ([facility])     Prediabetes     Sleep apnea     mild, no CPAP    Urinary urgency     Vision decreased        Past Surgical History:   Procedure Laterality Date    BREAST SURGERY PROCEDURE UNLISTED      reduction    HX  SECTION      x1    HX GYN  2002    tubal ligation    HX HYSTERECTOMY  14       Current Outpatient Prescriptions   Medication Sig Dispense Refill    cholecalciferol (VITAMIN D3) 1,000 unit cap Take  by mouth daily.  oxyCODONE-acetaminophen (PERCOCET 7.5) 7.5-325 mg per tablet Take 1 Tab by mouth two (2) times daily as needed for Pain for up to 30 days. Max Daily Amount: 2 Tabs. 60 Tab 0    simvastatin (ZOCOR) 20 mg tablet Take  by mouth nightly.  levETIRAcetam (KEPPRA) 250 mg tablet Take  by mouth as needed.  omeprazole (PRILOSEC) 20 mg capsule Take 20 mg by mouth daily.       lisinopril (PRINIVIL, ZESTRIL) 10 mg tablet Take 10 mg by mouth daily.  ergocalciferol (VITAMIN D2) 50,000 unit capsule Take 50,000 Units by mouth every seven (7) days.  teriflunomide (AUBAGIO) 14 mg tab Take 14 mg by mouth daily.  EPINEPHrine (EPIPEN) 0.3 mg/0.3 mL (1:1,000) injection 0.3 mL by IntraMUSCular route once as needed for up to 1 dose.  0.6 mL 0       Allergies   Allergen Reactions    Mushroom Flavor Other (comments)     Mushroom,    Olive Extract Other (comments)    Pcn [Penicillins] Unknown (comments)     denies    Shellfish Derived Other (comments)       Social History   Substance Use Topics    Smoking status: Former Smoker     Packs/day: 0.25     Years: 10.00     Types: Cigarettes     Quit date: 2016    Smokeless tobacco: Never Used    Alcohol use Yes      Comment: occasional       Family History   Problem Relation Age of Onset    Cancer Father     MS Sister    Yudi Chaney MS Brother        Family Status   Relation Status    Father     Mother Alive    Sister Alive    Sister Alive    Sister Alive    Brother Alive    Brother Alive       Review of Systems:  Positive in BOLD    CONST: Fever, weight loss, fatigue or chills  GI: Nausea, vomiting, abdominal pain, change in bowel habits, hematochezia, melena, and GERD   INTEG: Dermatitis, abnormal moles  HEENT: Recent changes in vision, vertigo, epistaxis, dysphagia and hoarseness  CV: Chest pain, palpitations, HTN, edema and varicosities  RESP: Cough, shortness of breath, wheezing, hemoptysis, snoring and reactive airway disease  : Hematuria, dysuria, frequency, urgency, nocturia and stress urinary incontinence, urinary retention  MS: Weakness, joint pain and arthritis  ENDO: Pre-diabetes, thyroid disease, polyuria, polydipsia, polyphagia, poor wound healing, heat intolerance, cold intolerance  LYMPH/HEME: Anemia, bruising and history of blood transfusions  NEURO: Dizziness, headache, fainting, seizures and stroke  PSYCH: Anxiety and depression      Physical Exam    Visit Vitals    /70    Pulse 74    Temp 98.6 °F (37 °C)    Resp 18    Ht 5' 8\" (1.727 m)    Wt 106.6 kg (235 lb)    BMI 35.73 kg/m2       Pre op weight: 235  EBW: 92  Wt loss to date: 0       General: 50 y.o.) female in no acute distress. Morbidly obese in abdomen and hips - mixed pattern  HEENT: Normocephalic, atraumatic, Pupils equal and reactive, nasopharynx clear, oropharynx clear and moist without lesions  NECK: Supple, no lymphadenopathy, thyromegaly, carotid bruits or jugular venous distension. trachea midline  RESP: Clear to auscultation bilaterally, no wheezes, rhonchi, or rales, normal respiratory excursion  CV: Regular rate and rhythm, no murmurs, rubs or gallops. 3+/4 pulses in bilateral dorsalis pedis and posterior tibialis. No distal edema or varicosities. ABD: Soft, nontender, nondistended, normoactive bowel sounds, no hernias, no hepatosplenomegaly, easily palpable costal margins, mixed distribution, healed lap and pfannenstiel scars  Extremities: Warm, well perfused, no tenderness or swelling, normal gait/station  Neuro: Sensation and strength grossly intact and symmetrical  Psych: Alert and oriented to person, place, and time. Impression:    Fermin Simpson is a 50 y.o. female who is suffering from morbid obesity with a BMI of 36  and comorbidities including hypertension, hyperlipidemia, GERD, obstructive sleep apnea - clinical and weight related arthopathies  who would benefit from bariatric surgery. We have had an extensive discussion with regard to the risks, benefits and likely outcomes of the operation. We've discussed the restrictive and malabsorptive nature of the gastric bypass and compared and contrasted with the sleeve gastrectomy. The patient understands the likelihood of losing approximately 80% of their excess weight in 12 to 18 months.  She also understands the risks including but not limited to bleeding, infection, need for reoperation, ulcers, leaks and strictures, bowel obstruction secondary to adhesions and internal hernias, DVT, PE, heart attack, stroke, and death. Patient also understands risks of inadequate weight loss, excess weight loss, vitamin insufficiency, protein malnutrition, excess skin, and loss of hair. We have reviewed the components of a successful postoperative course including requirement for a high protein, low carbohydrate diet, 60 oz a day of zero calorie liquids, daily vitamin supplementation, daily exercise, regular follow-up, and participation in support groups. At this time we will enroll the patient in our bariatric program, undertake routine laboratory evaluation, chest X-ray, EKG, possible UGI and evaluation by  nutritionist as well as psychologist and pending their satisfactory completion of the preop evaluation, plan to perform a sleeve gastrectomy.

## 2018-06-27 NOTE — PROGRESS NOTES
Pt ID confirmed    Weight Loss Metrics 6/27/2018 6/27/2018 5/7/2018 2/6/2018 11/9/2017 9/12/2017 6/15/2017   Pre op / Initial Wt 235 - - - - - -   Today's Wt - 235 lb 238 lb 245 lb 245 lb 227 lb 227 lb   BMI - 35.73 kg/m2 36.19 kg/m2 37.25 kg/m2 37.25 kg/m2 34.52 kg/m2 34.52 kg/m2   Ideal Body Wt 143 - - - - - -   Excess Body Wt 92 - - - - - -   Goal Wt 161 - - - - - -   Wt loss to date 0 - - - - - -   % Wt Loss 0 - - - - - -   80% EBW 73.6 - - - - - -       Body mass index is 35.73 kg/(m^2).

## 2018-07-11 ENCOUNTER — DOCUMENTATION ONLY (OUTPATIENT)
Dept: BARIATRICS/WEIGHT MGMT | Age: 48
End: 2018-07-11

## 2018-07-11 NOTE — PROGRESS NOTES
7/11/18:  Patient did not show for her nutrition appointment.   She has been contacted and rescheduled for July 18 at 9:15 am.    Isabel Jolly MS RD

## 2018-07-18 ENCOUNTER — DOCUMENTATION ONLY (OUTPATIENT)
Dept: BARIATRICS/WEIGHT MGMT | Age: 48
End: 2018-07-18

## 2018-07-18 ENCOUNTER — HOSPITAL ENCOUNTER (OUTPATIENT)
Dept: BARIATRICS/WEIGHT MGMT | Age: 48
Discharge: HOME | End: 2018-07-18

## 2018-07-18 NOTE — PROGRESS NOTES
[address] [facility] [address] [address], [address]    Patient's Name: Tesise Jarvis   Age: 46 y.o. YOB: 1970   Sex: female    Date:   7/18/2018    Insurance:            Session: 1 of  3  Revision:   Surgeon:  Dr. Tl Ravi    Height: 5 f 8 Weight:    236      Lbs. BMI: 35.9   Pounds Lost since last month: 0               Pounds Gained since last month: 1    Starting Weight: 235   Previous Months Weight: 235  Overall Pounds Lost: 0 Overall Pounds Gained: 1      Do you smoke? None    Alcohol intake:  Number of drinks at a time:  1  Number of times a month: 1    Class Guidelines    Guidelines are reviewed with patient at the start of every class. 1. Patient understands that weight loss trial classes must be consecutive. Patient understands if they miss a class, it is their responsibility to contact me to reschedule class. I will reach out to patient after their first no show. 2.  Patient understands the expectations that weight maintenance/weight loss is expected during the classes. Failure to demonstrate changes may result in one extra month of weight loss trial, followed by going back to see the surgeon. Patient understands that they CAN NOT gain any weight during the weight loss trial.  Gaining weight will result in extra classes. 3. Patient is also instructed to be doing their labs, blood work, psych visit, support group and any other test that the surgeon has used while they are working on their weight loss trial.  4.  Patient was instructed to bring their blue binder to every class and appointment. Other Pertinent Information:     Changes Made Since Last Class: Lost 5 pounds    Eating Habits and Behaviors    Today in class, we started talking about the key diet principles. We first focused on stopping liquid calories. Patient was also educated on carbohydrates.   Patient was instructed to start cutting out bread, rice, and pasta from the diet and start focusing more on meat and vegetables. I then gave a power point, which focused on Label Reading. In class, I gave patients a labels and we worked through a series of questions to help patients have a better understanding of label reading. Patient was instructed to review the serving size. Patient was encouraged to focus on protein and carbohydrates. We also did a few label reading activities to help the patient become more familiar with label reading. Patient's current diet habits include: Patient states she is eating 4-5 meals per day. Per diet history, her meals are high in carbohydrates and recommendations were made that are lower in carbohydrates. She states she is snacking on cookies, cakes, and candy, which I have talked about with her and made recommendations for lower carbohydrate snack choices. She states she is drinking water and regular diet soda all day long, which I have talked to her about stopping liquid calories. Physical Activity/Exercise    Comments: We talked about exercise. Patient was given reasons of why exercise is so important and how that can help with their long-term success. I have encouraged patient to get a support system to help with the activity. Currently for activity, patient is doing a 3 minute walk. Behavior Modification       Comments:  Behavior modifications were reinforced. This included not eating in front of the TV, which could lead to bigger portions and eating when one is not hungry. We also talked about the importance of eating 3 meals per day. Patient was encouraged to food journal to keep their daily carbohydrates less than 30 grams per meal.      Goals that patient wants to work on includes:  1. Stop eating candy.   2. Not eat after 6 pm      Gail Quan RD  7/18/2018

## 2018-07-30 DIAGNOSIS — G89.4 CHRONIC PAIN SYNDROME: ICD-10-CM

## 2018-07-30 RX ORDER — OXYCODONE AND ACETAMINOPHEN 7.5; 325 MG/1; MG/1
1 TABLET ORAL
Qty: 18 TAB | Refills: 0 | Status: SHIPPED | OUTPATIENT
Start: 2018-07-30 | End: 2018-08-08

## 2018-08-07 ENCOUNTER — TELEPHONE (OUTPATIENT)
Dept: PAIN MANAGEMENT | Age: 48
End: 2018-08-07
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
## 2018-08-15 ENCOUNTER — DOCUMENTATION ONLY (OUTPATIENT)
Dept: BARIATRICS/WEIGHT MGMT | Age: 48
End: 2018-08-15
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
## 2018-08-23 ENCOUNTER — HOSPITAL ENCOUNTER (OUTPATIENT)
Dept: BARIATRICS/WEIGHT MGMT | Age: 48
Discharge: HOME | End: 2018-08-23

## 2018-08-23 ENCOUNTER — DOCUMENTATION ONLY (OUTPATIENT)
Dept: BARIATRICS/WEIGHT MGMT | Age: 48
End: 2018-08-23
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
## 2018-09-21 ENCOUNTER — DOCUMENTATION ONLY (OUTPATIENT)
Dept: BARIATRICS/WEIGHT MGMT | Age: 48
End: 2018-09-21

## 2018-09-21 NOTE — PROGRESS NOTES
9/21/18: Patient did not show for her nutrition class. She was left a voicemail and asked to call me ASAP as there is only a week of classes left in September.       Kamala Marquis MS RD

## 2018-09-24 ENCOUNTER — DOCUMENTATION ONLY (OUTPATIENT)
Dept: BARIATRICS/WEIGHT MGMT | Age: 48
End: 2018-09-24

## 2018-09-24 NOTE — PROGRESS NOTES
9/24/18:  Patient did not show for class at 10:15 am this morning. I contacted her and she was rescheduled for 3:15 pm today. Patient did not show that visit either.     Rosa Pierson MS RD

## 2018-09-28 ENCOUNTER — TELEPHONE (OUTPATIENT)
Dept: SURGERY | Age: 48
End: 2018-09-28
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
## 2018-11-26 PROBLEM — D89.9 DISORDER OF IMMUNE MECHANISM (HCC): Status: ACTIVE | Noted: 2017-04-13

## 2018-11-26 PROBLEM — M51.26 HNP (HERNIATED NUCLEUS PULPOSUS), LUMBAR: Status: ACTIVE | Noted: 2018-11-02

## 2018-11-26 PROBLEM — D49.7 INTRADURAL EXTRAMEDULLARY SPINAL TUMOR: Status: ACTIVE | Noted: 2018-11-02

## 2018-11-26 PROBLEM — M54.42 ACUTE MIDLINE LOW BACK PAIN WITH LEFT-SIDED SCIATICA: Status: ACTIVE | Noted: 2018-11-02

## 2019-05-15 ENCOUNTER — HOSPITAL ENCOUNTER (OUTPATIENT)
Dept: ULTRASOUND IMAGING | Age: 49
Discharge: HOME | End: 2019-05-15
Attending: PLASTIC SURGERY
Payer: MEDICARE

## 2019-05-15 DIAGNOSIS — K43.9 HERNIA, VENTRAL: ICD-10-CM

## 2019-05-15 PROCEDURE — 76705 ECHO EXAM OF ABDOMEN: CPT

## 2019-09-04 ENCOUNTER — HOSPITAL ENCOUNTER (OUTPATIENT)
Dept: ULTRASOUND IMAGING | Age: 49
Discharge: HOME | End: 2019-09-04
Attending: PLASTIC SURGERY
Payer: MEDICARE

## 2019-09-04 DIAGNOSIS — K43.2 HERNIA, INCISIONAL: ICD-10-CM

## 2019-09-04 PROCEDURE — 76705 ECHO EXAM OF ABDOMEN: CPT

## 2019-10-10 ENCOUNTER — HOSPITAL ENCOUNTER (OUTPATIENT)
Dept: LAB | Age: 49
Discharge: HOME | End: 2019-10-10
Payer: MEDICARE

## 2019-10-10 PROCEDURE — 88305 TISSUE EXAM BY PATHOLOGIST: CPT

## 2020-08-19 ENCOUNTER — HOSPITAL ENCOUNTER (OUTPATIENT)
Age: 50
Discharge: HOME | End: 2020-08-19
Attending: PHYSICIAN ASSISTANT
Payer: MEDICARE

## 2020-08-19 DIAGNOSIS — M23.91 DERANGEMENT OF COLLATERAL LIGAMENT OF RIGHT KNEE: ICD-10-CM

## 2020-08-19 DIAGNOSIS — M23.92 DERANGEMENT OF COLLATERAL LIGAMENT OF LEFT KNEE: ICD-10-CM

## 2020-08-19 DIAGNOSIS — S80.02XD CONTUSION OF LEFT KNEE, SUBSEQUENT ENCOUNTER: ICD-10-CM

## 2020-08-19 DIAGNOSIS — S80.01XD CONTUSION OF RIGHT KNEE, SUBSEQUENT ENCOUNTER: ICD-10-CM

## 2020-08-19 PROCEDURE — 73721 MRI JNT OF LWR EXTRE W/O DYE: CPT

## 2020-09-09 ENCOUNTER — HOSPITAL ENCOUNTER (OUTPATIENT)
Age: 50
Discharge: HOME | End: 2020-09-09
Attending: PHYSICIAN ASSISTANT
Payer: MEDICARE

## 2020-09-09 PROCEDURE — 73721 MRI JNT OF LWR EXTRE W/O DYE: CPT

## 2021-03-05 ENCOUNTER — HOSPITAL ENCOUNTER (OUTPATIENT)
Dept: LAB | Age: 51
Discharge: HOME | End: 2021-03-05
Payer: MEDICARE

## 2021-03-05 ENCOUNTER — TRANSCRIBE ORDER (OUTPATIENT)
Dept: REGISTRATION | Age: 51
End: 2021-03-05

## 2021-03-05 DIAGNOSIS — Z01.818 PRE-OP EVALUATION: Primary | ICD-10-CM

## 2021-03-05 DIAGNOSIS — Z01.818 PRE-OP EVALUATION: ICD-10-CM

## 2021-03-05 LAB
ALBUMIN SERPL-MCNC: 4 G/DL (ref 3.4–5)
ALBUMIN/GLOB SERPL: 1.3 {RATIO} (ref 0.8–1.7)
ALP SERPL-CCNC: 71 U/L (ref 45–117)
ALT SERPL-CCNC: 34 U/L (ref 13–56)
ANION GAP SERPL CALC-SCNC: 7 MMOL/L (ref 3–18)
AST SERPL-CCNC: 25 U/L (ref 10–38)
ATRIAL RATE: 67 BPM
BILIRUB SERPL-MCNC: 0.5 MG/DL (ref 0.2–1)
BUN SERPL-MCNC: 7 MG/DL (ref 7–18)
BUN/CREAT SERPL: 11 (ref 12–20)
CALCIUM SERPL-MCNC: 9.4 MG/DL (ref 8.5–10.1)
CALCULATED P AXIS, ECG09: 56 DEGREES
CALCULATED R AXIS, ECG10: 64 DEGREES
CALCULATED T AXIS, ECG11: 31 DEGREES
CHLORIDE SERPL-SCNC: 106 MMOL/L (ref 100–111)
CO2 SERPL-SCNC: 32 MMOL/L (ref 21–32)
CREAT SERPL-MCNC: 0.62 MG/DL (ref 0.6–1.3)
DIAGNOSIS, 93000: NORMAL
GLOBULIN SER CALC-MCNC: 3.2 G/DL (ref 2–4)
GLUCOSE SERPL-MCNC: 128 MG/DL (ref 74–99)
P-R INTERVAL, ECG05: 168 MS
POTASSIUM SERPL-SCNC: 3.4 MMOL/L (ref 3.5–5.5)
PROT SERPL-MCNC: 7.2 G/DL (ref 6.4–8.2)
Q-T INTERVAL, ECG07: 366 MS
QRS DURATION, ECG06: 100 MS
QTC CALCULATION (BEZET), ECG08: 386 MS
SODIUM SERPL-SCNC: 145 MMOL/L (ref 136–145)
VENTRICULAR RATE, ECG03: 67 BPM

## 2021-03-05 PROCEDURE — 36415 COLL VENOUS BLD VENIPUNCTURE: CPT

## 2021-03-05 PROCEDURE — 93005 ELECTROCARDIOGRAM TRACING: CPT

## 2021-03-05 PROCEDURE — 80053 COMPREHEN METABOLIC PANEL: CPT

## 2021-11-10 ENCOUNTER — HOSPITAL ENCOUNTER (OUTPATIENT)
Dept: LAB | Age: 51
Discharge: HOME | End: 2021-11-10
Payer: MEDICARE

## 2021-11-10 LAB — HBA1C MFR BLD: 6.7 % (ref 4.2–5.6)

## 2021-11-10 PROCEDURE — 83036 HEMOGLOBIN GLYCOSYLATED A1C: CPT

## 2021-11-10 PROCEDURE — 36415 COLL VENOUS BLD VENIPUNCTURE: CPT

## 2021-11-11 ENCOUNTER — TRANSCRIBE ORDER (OUTPATIENT)
Dept: SCHEDULING | Age: 51
End: 2021-11-11

## 2021-11-11 DIAGNOSIS — M17.12 DEGENERATIVE ARTHRITIS OF LEFT KNEE: Primary | ICD-10-CM

## 2021-12-14 ENCOUNTER — HOSPITAL ENCOUNTER (OUTPATIENT)
Dept: GENERAL RADIOLOGY | Age: 51
Discharge: HOME | End: 2021-12-14
Payer: MEDICARE

## 2021-12-14 ENCOUNTER — HOSPITAL ENCOUNTER (OUTPATIENT)
Dept: LAB | Age: 51
Discharge: HOME | End: 2021-12-14
Payer: MEDICARE

## 2021-12-14 ENCOUNTER — TRANSCRIBE ORDER (OUTPATIENT)
Dept: REGISTRATION | Age: 51
End: 2021-12-14

## 2021-12-14 DIAGNOSIS — I10 ESSENTIAL HYPERTENSION, MALIGNANT: ICD-10-CM

## 2021-12-14 DIAGNOSIS — I10 ESSENTIAL HYPERTENSION, MALIGNANT: Primary | ICD-10-CM

## 2021-12-14 LAB
ALBUMIN SERPL-MCNC: 3.9 G/DL (ref 3.4–5)
ALBUMIN/GLOB SERPL: 1 {RATIO} (ref 0.8–1.7)
ALP SERPL-CCNC: 83 U/L (ref 45–117)
ALT SERPL-CCNC: 29 U/L (ref 13–56)
ANION GAP SERPL CALC-SCNC: 5 MMOL/L (ref 3–18)
APPEARANCE UR: CLEAR
APTT PPP: 25.7 SEC (ref 23–36.4)
AST SERPL-CCNC: 29 U/L (ref 10–38)
ATRIAL RATE: 84 BPM
BACTERIA URNS QL MICRO: NEGATIVE /HPF
BASOPHILS # BLD: 0.1 K/UL (ref 0–0.1)
BASOPHILS NFR BLD: 1 % (ref 0–2)
BILIRUB SERPL-MCNC: 0.4 MG/DL (ref 0.2–1)
BILIRUB UR QL: NEGATIVE
BUN SERPL-MCNC: 10 MG/DL (ref 7–18)
BUN/CREAT SERPL: 14 (ref 12–20)
CALCIUM SERPL-MCNC: 9.4 MG/DL (ref 8.5–10.1)
CALCULATED P AXIS, ECG09: 58 DEGREES
CALCULATED R AXIS, ECG10: 65 DEGREES
CALCULATED T AXIS, ECG11: 26 DEGREES
CHLORIDE SERPL-SCNC: 100 MMOL/L (ref 100–111)
CO2 SERPL-SCNC: 33 MMOL/L (ref 21–32)
COLOR UR: YELLOW
CREAT SERPL-MCNC: 0.69 MG/DL (ref 0.6–1.3)
DIAGNOSIS, 93000: NORMAL
DIFFERENTIAL METHOD BLD: ABNORMAL
EOSINOPHIL # BLD: 0 K/UL (ref 0–0.4)
EOSINOPHIL NFR BLD: 0 % (ref 0–5)
EPITH CASTS URNS QL MICRO: NORMAL /LPF (ref 0–5)
ERYTHROCYTE [DISTWIDTH] IN BLOOD BY AUTOMATED COUNT: 13.6 % (ref 11.6–14.5)
GLOBULIN SER CALC-MCNC: 3.8 G/DL (ref 2–4)
GLUCOSE SERPL-MCNC: 109 MG/DL (ref 74–99)
GLUCOSE UR STRIP.AUTO-MCNC: NEGATIVE MG/DL
HCT VFR BLD AUTO: 41.5 % (ref 35–45)
HGB BLD-MCNC: 13.1 G/DL (ref 12–16)
HGB UR QL STRIP: NEGATIVE
IMM GRANULOCYTES # BLD AUTO: 0 K/UL (ref 0–0.04)
IMM GRANULOCYTES NFR BLD AUTO: 0 % (ref 0–0.5)
INR PPP: 0.9 (ref 0.8–1.2)
KETONES UR QL STRIP.AUTO: NEGATIVE MG/DL
LEUKOCYTE ESTERASE UR QL STRIP.AUTO: NEGATIVE
LYMPHOCYTES # BLD: 2 K/UL (ref 0.9–3.6)
LYMPHOCYTES NFR BLD: 28 % (ref 21–52)
MCH RBC QN AUTO: 26.7 PG (ref 24–34)
MCHC RBC AUTO-ENTMCNC: 31.6 G/DL (ref 31–37)
MCV RBC AUTO: 84.5 FL (ref 78–100)
MONOCYTES # BLD: 0.8 K/UL (ref 0.05–1.2)
MONOCYTES NFR BLD: 11 % (ref 3–10)
NEUTS SEG # BLD: 4.3 K/UL (ref 1.8–8)
NEUTS SEG NFR BLD: 60 % (ref 40–73)
NITRITE UR QL STRIP.AUTO: NEGATIVE
NRBC # BLD: 0 K/UL (ref 0–0.01)
NRBC BLD-RTO: 0 PER 100 WBC
P-R INTERVAL, ECG05: 152 MS
PH UR STRIP: 7 [PH] (ref 5–8)
PLATELET # BLD AUTO: 277 K/UL (ref 135–420)
PMV BLD AUTO: 11 FL (ref 9.2–11.8)
POTASSIUM SERPL-SCNC: 3.4 MMOL/L (ref 3.5–5.5)
PROT SERPL-MCNC: 7.7 G/DL (ref 6.4–8.2)
PROT UR STRIP-MCNC: NEGATIVE MG/DL
PROTHROMBIN TIME: 11.5 SEC (ref 11.5–15.2)
Q-T INTERVAL, ECG07: 356 MS
QRS DURATION, ECG06: 88 MS
QTC CALCULATION (BEZET), ECG08: 420 MS
RBC # BLD AUTO: 4.91 M/UL (ref 4.2–5.3)
RBC #/AREA URNS HPF: NEGATIVE /HPF (ref 0–5)
SODIUM SERPL-SCNC: 138 MMOL/L (ref 136–145)
SP GR UR REFRACTOMETRY: 1.01 (ref 1–1.03)
UROBILINOGEN UR QL STRIP.AUTO: 0.2 EU/DL (ref 0.2–1)
VENTRICULAR RATE, ECG03: 84 BPM
WBC # BLD AUTO: 7.2 K/UL (ref 4.6–13.2)
WBC URNS QL MICRO: NEGATIVE /HPF (ref 0–5)

## 2021-12-14 PROCEDURE — 85730 THROMBOPLASTIN TIME PARTIAL: CPT

## 2021-12-14 PROCEDURE — 80053 COMPREHEN METABOLIC PANEL: CPT

## 2021-12-14 PROCEDURE — 81001 URINALYSIS AUTO W/SCOPE: CPT

## 2021-12-14 PROCEDURE — 71046 X-RAY EXAM CHEST 2 VIEWS: CPT

## 2021-12-14 PROCEDURE — 36415 COLL VENOUS BLD VENIPUNCTURE: CPT

## 2021-12-14 PROCEDURE — 85610 PROTHROMBIN TIME: CPT

## 2021-12-14 PROCEDURE — 85025 COMPLETE CBC W/AUTO DIFF WBC: CPT

## 2021-12-14 PROCEDURE — 93005 ELECTROCARDIOGRAM TRACING: CPT

## 2021-12-15 ENCOUNTER — HOSPITAL ENCOUNTER (OUTPATIENT)
Dept: VASCULAR SURGERY | Age: 51
Discharge: HOME | End: 2021-12-15
Attending: ORTHOPAEDIC SURGERY
Payer: MEDICARE

## 2021-12-15 DIAGNOSIS — M17.12 DEGENERATIVE ARTHRITIS OF LEFT KNEE: ICD-10-CM

## 2021-12-15 PROCEDURE — 93926 LOWER EXTREMITY STUDY: CPT

## 2021-12-16 LAB
LEFT CFA PROX SYS PSV: 142.3 CM/S
LEFT DIST PTA PSV: 31.8 CM/S
LEFT POP A PROX VEL RATIO: 0.77
LEFT POPLITEAL DIST SYS PSV: 52.6 CM/S
LEFT POPLITEAL PROX SYS PSV: 60.3 CM/S
LEFT PROX ATA VELOCITY: 51.4 CM/S
LEFT PROX PFA A PSV: 100.5 CM/S
LEFT PROX PTA PSV: 68 CM/S
LEFT PTA MID SYS PSV: 73.5 CM/S
LEFT SFA DIST VEL RATIO: 0.72
LEFT SFA MID VEL RATIO: 0.87
LEFT SFA PROX VEL RATIO: 0.89
LEFT SUPER FEMORAL DIST SYS PSV: 78.8 CM/S
LEFT SUPER FEMORAL MID SYS PSV: 109.8 CM/S
LEFT SUPER FEMORAL PROX SYS PSV: 126.1 CM/S

## 2021-12-17 NOTE — PERIOP NOTES
PRE-SURGICAL INSTRUCTIONS        Patient's Name:  Nicole UMANA Date:  12/17/2021            Covid Testing Date and Time:    Surgery Date:  12/29/2021                1. Do NOT eat or drink anything, including candy, gum, or ice chips after midnight on 12/29, unless you have specific instructions from your surgeon or anesthesia provider to do so.  2. You may brush your teeth before coming to the hospital.  3. No smoking 24 hours prior to the day of surgery. 4. No alcohol 24 hours prior to the day of surgery. 5. No recreational drugs for one week prior to the day of surgery. 6. Leave all valuables, including money/purse, at home. 7. Remove all jewelry, nail polish, acrylic nails, and makeup (including mascara); no lotions powders, deodorant, or perfume/cologne/after shave on the skin. 8. Follow instruction for Hibiclens washes and CHG wipes from surgeon's office. 9. Glasses/contact lenses and dentures may be worn to the hospital.  They will be removed prior to surgery. 10. Call your doctor if symptoms of a cold or illness develop within 24-48 hours prior to your surgery. 11.  If you are having an outpatient procedure, please make arrangements for a responsible ADULT TO [address] [place] [address] and stay with you for 24 hours after your surgery. 12. ONE VISITOR in the hospital at this time for outpatient procedures. Exceptions may be made for surgical admissions, per nursing unit guidelines      Special Instructions:      Bring list of CURRENT medications. Bring any pertinent legal medical records. Take these medications the morning of surgery with a sip of water:  As directed by MD  Follow physician instructions about stopping anticoagulants. On the day of surgery, come in the main entrance of [facility]. Let the  at the desk know you are there for surgery. A staff member will come escort you to the surgical area on the second floor.     If you have any questions or concerns, please do not hesitate to call:     (Prior to the day of surgery) [facility] department:  [phone]   (Day of surgery) Pre-Op department:  [phone]    These surgical instructions were reviewed with Danette Simpson during the [facility] phone call.

## 2021-12-28 ENCOUNTER — ANESTHESIA EVENT (OUTPATIENT)
Dept: SURGERY | Age: 51
DRG: 470 | End: 2021-12-28
Payer: MEDICARE

## 2021-12-28 ENCOUNTER — TRANSCRIBE ORDER (OUTPATIENT)
Dept: REGISTRATION | Age: 51
End: 2021-12-28

## 2021-12-28 ENCOUNTER — HOSPITAL ENCOUNTER (OUTPATIENT)
Dept: PREADMISSION TESTING | Age: 51
Discharge: HOME | End: 2021-12-28
Payer: MEDICARE

## 2021-12-28 DIAGNOSIS — I10 ESSENTIAL HYPERTENSION, MALIGNANT: Primary | ICD-10-CM

## 2021-12-28 DIAGNOSIS — I10 ESSENTIAL HYPERTENSION, MALIGNANT: ICD-10-CM

## 2021-12-28 PROCEDURE — 36415 COLL VENOUS BLD VENIPUNCTURE: CPT

## 2021-12-28 PROCEDURE — 86900 BLOOD TYPING SEROLOGIC ABO: CPT

## 2021-12-29 ENCOUNTER — HOSPITAL ENCOUNTER (INPATIENT)
Age: 51
LOS: 1 days | Discharge: HOME HEALTH | DRG: 470 | End: 2021-12-30
Attending: ORTHOPAEDIC SURGERY | Admitting: ORTHOPAEDIC SURGERY
Payer: MEDICARE

## 2021-12-29 ENCOUNTER — HOME HEALTH ADMISSION (OUTPATIENT)
Dept: HOME HEALTH SERVICES | Facility: HOME HEALTH | Age: 51
End: 2021-12-29
Payer: MEDICARE

## 2021-12-29 ENCOUNTER — ANESTHESIA (OUTPATIENT)
Dept: SURGERY | Age: 51
DRG: 470 | End: 2021-12-29
Payer: MEDICARE

## 2021-12-29 PROBLEM — M17.9 KNEE OSTEOARTHRITIS: Status: ACTIVE | Noted: 2021-12-29

## 2021-12-29 LAB
ABO + RH BLD: NORMAL
BLOOD GROUP ANTIBODIES SERPL: NORMAL
HCT VFR BLD AUTO: 39.1 % (ref 35–45)
HGB BLD-MCNC: 12.5 G/DL (ref 12–16)
SPECIMEN EXP DATE BLD: NORMAL

## 2021-12-29 PROCEDURE — 64447 NJX AA&/STRD FEMORAL NRV IMG: CPT | Performed by: ANESTHESIOLOGY

## 2021-12-29 PROCEDURE — 74011000250 HC RX REV CODE- 250: Performed by: ORTHOPAEDIC SURGERY

## 2021-12-29 PROCEDURE — 74011250637 HC RX REV CODE- 250/637: Performed by: ORTHOPAEDIC SURGERY

## 2021-12-29 PROCEDURE — 74011000258 HC RX REV CODE- 258: Performed by: ANESTHESIOLOGY

## 2021-12-29 PROCEDURE — 97161 PT EVAL LOW COMPLEX 20 MIN: CPT

## 2021-12-29 PROCEDURE — 77030003029 HC SUT VCRL J&J -B: Performed by: ORTHOPAEDIC SURGERY

## 2021-12-29 PROCEDURE — 64450 NJX AA&/STRD OTHER PN/BRANCH: CPT | Performed by: ANESTHESIOLOGY

## 2021-12-29 PROCEDURE — C1776 JOINT DEVICE (IMPLANTABLE): HCPCS | Performed by: ORTHOPAEDIC SURGERY

## 2021-12-29 PROCEDURE — 2709999900 HC NON-CHARGEABLE SUPPLY: Performed by: ORTHOPAEDIC SURGERY

## 2021-12-29 PROCEDURE — 77030040361 HC SLV COMPR DVT MDII -B: Performed by: ORTHOPAEDIC SURGERY

## 2021-12-29 PROCEDURE — 01400 ANES OPN/ARTHRS KNEE JT NOS: CPT | Performed by: ANESTHESIOLOGY

## 2021-12-29 PROCEDURE — 77030013708 HC HNDPC SUC IRR PULS STRY –B: Performed by: ORTHOPAEDIC SURGERY

## 2021-12-29 PROCEDURE — 77030031139 HC SUT VCRL2 J&J -A: Performed by: ORTHOPAEDIC SURGERY

## 2021-12-29 PROCEDURE — C1713 ANCHOR/SCREW BN/BN,TIS/BN: HCPCS | Performed by: ORTHOPAEDIC SURGERY

## 2021-12-29 PROCEDURE — 74011250636 HC RX REV CODE- 250/636: Performed by: NURSE ANESTHETIST, CERTIFIED REGISTERED

## 2021-12-29 PROCEDURE — 77030012411 HC DRN WND CARD -A: Performed by: ORTHOPAEDIC SURGERY

## 2021-12-29 PROCEDURE — 74011250636 HC RX REV CODE- 250/636: Performed by: ANESTHESIOLOGY

## 2021-12-29 PROCEDURE — 77030022295 HC SEAL BPLR VSL DISP MEDT -F: Performed by: ORTHOPAEDIC SURGERY

## 2021-12-29 PROCEDURE — 77030008683 HC TU ET CUF COVD -A: Performed by: ANESTHESIOLOGY

## 2021-12-29 PROCEDURE — 77030018836 HC SOL IRR NACL ICUM -A: Performed by: ORTHOPAEDIC SURGERY

## 2021-12-29 PROCEDURE — 74011250637 HC RX REV CODE- 250/637: Performed by: NURSE ANESTHETIST, CERTIFIED REGISTERED

## 2021-12-29 PROCEDURE — 65270000029 HC RM PRIVATE

## 2021-12-29 PROCEDURE — 76010000172 HC OR TIME 2.5 TO 3 HR INTENSV-TIER 1: Performed by: ORTHOPAEDIC SURGERY

## 2021-12-29 PROCEDURE — 74011250636 HC RX REV CODE- 250/636: Performed by: ORTHOPAEDIC SURGERY

## 2021-12-29 PROCEDURE — C9290 INJ, BUPIVACAINE LIPOSOME: HCPCS | Performed by: ORTHOPAEDIC SURGERY

## 2021-12-29 PROCEDURE — 77030006812 HC BLD SAW RECIP STRY -B: Performed by: ORTHOPAEDIC SURGERY

## 2021-12-29 PROCEDURE — 74011000258 HC RX REV CODE- 258: Performed by: ORTHOPAEDIC SURGERY

## 2021-12-29 PROCEDURE — 74011000250 HC RX REV CODE- 250: Performed by: ANESTHESIOLOGY

## 2021-12-29 PROCEDURE — 85018 HEMOGLOBIN: CPT

## 2021-12-29 PROCEDURE — 77030002933 HC SUT MCRYL J&J -A: Performed by: ORTHOPAEDIC SURGERY

## 2021-12-29 PROCEDURE — 77030012935 HC DRSG AQUACEL BMS -B: Performed by: ORTHOPAEDIC SURGERY

## 2021-12-29 PROCEDURE — 77030003601 HC NDL NRV BLK BBMI -A: Performed by: ORTHOPAEDIC SURGERY

## 2021-12-29 PROCEDURE — 36415 COLL VENOUS BLD VENIPUNCTURE: CPT

## 2021-12-29 PROCEDURE — 76060000036 HC ANESTHESIA 2.5 TO 3 HR: Performed by: ORTHOPAEDIC SURGERY

## 2021-12-29 PROCEDURE — 77030040922 HC BLNKT HYPOTHRM STRY -A: Performed by: ORTHOPAEDIC SURGERY

## 2021-12-29 PROCEDURE — 76210000006 HC OR PH I REC 0.5 TO 1 HR: Performed by: ORTHOPAEDIC SURGERY

## 2021-12-29 PROCEDURE — 97116 GAIT TRAINING THERAPY: CPT

## 2021-12-29 PROCEDURE — 77030000032 HC CUF TRNQT ZIMM -B: Performed by: ORTHOPAEDIC SURGERY

## 2021-12-29 PROCEDURE — 0SRD0J9 REPLACEMENT OF LEFT KNEE JOINT WITH SYNTHETIC SUBSTITUTE, CEMENTED, OPEN APPROACH: ICD-10-PCS | Performed by: ORTHOPAEDIC SURGERY

## 2021-12-29 PROCEDURE — 77030006835 HC BLD SAW SAG STRY -B: Performed by: ORTHOPAEDIC SURGERY

## 2021-12-29 PROCEDURE — 77030010785: Performed by: ORTHOPAEDIC SURGERY

## 2021-12-29 PROCEDURE — 77030008462 HC STPLR SKN PROX J&J -A: Performed by: ORTHOPAEDIC SURGERY

## 2021-12-29 PROCEDURE — 76942 ECHO GUIDE FOR BIOPSY: CPT | Performed by: ANESTHESIOLOGY

## 2021-12-29 DEVICE — COMPONENT PAT DIA38MM POLYETH DOME CEM MEDIALIZED ATTUNE: Type: IMPLANTABLE DEVICE | Site: KNEE | Status: FUNCTIONAL

## 2021-12-29 DEVICE — COMPONENT FEM SZ 7 L KNEE POST STBL CEM ATTUNE: Type: IMPLANTABLE DEVICE | Site: KNEE | Status: FUNCTIONAL

## 2021-12-29 DEVICE — COMPONENT TIB SZ 6 CEM BASE ROT PLATFRM KNEE SYS ATTUNE: Type: IMPLANTABLE DEVICE | Site: KNEE | Status: FUNCTIONAL

## 2021-12-29 DEVICE — CEMENT BNE GENTAMICIN 40 GM HI VISC SINGLE DOSE CMW 1: Type: IMPLANTABLE DEVICE | Site: KNEE | Status: FUNCTIONAL

## 2021-12-29 DEVICE — INSERT TIB SZ 7 THK7MM KNEE POST STBL ROT PLATFRM ATTUNE: Type: IMPLANTABLE DEVICE | Site: KNEE | Status: FUNCTIONAL

## 2021-12-29 DEVICE — COMPONENT PART KNEE CAPPED [K1DEPUY] [JNJ HEALTHCARE]: Type: IMPLANTABLE DEVICE | Site: KNEE | Status: FUNCTIONAL

## 2021-12-29 RX ORDER — FENTANYL CITRATE 50 UG/ML
50 INJECTION, SOLUTION INTRAMUSCULAR; INTRAVENOUS
Status: DISCONTINUED | OUTPATIENT
Start: 2021-12-29 | End: 2021-12-29

## 2021-12-29 RX ORDER — ROPIVACAINE HYDROCHLORIDE 2 MG/ML
30 INJECTION, SOLUTION EPIDURAL; INFILTRATION; PERINEURAL
Status: COMPLETED | OUTPATIENT
Start: 2021-12-29 | End: 2021-12-29

## 2021-12-29 RX ORDER — ONDANSETRON 2 MG/ML
4 INJECTION INTRAMUSCULAR; INTRAVENOUS ONCE
Status: DISCONTINUED | OUTPATIENT
Start: 2021-12-29 | End: 2021-12-29 | Stop reason: HOSPADM

## 2021-12-29 RX ORDER — PROPOFOL 10 MG/ML
INJECTION, EMULSION INTRAVENOUS AS NEEDED
Status: DISCONTINUED | OUTPATIENT
Start: 2021-12-29 | End: 2021-12-29 | Stop reason: HOSPADM

## 2021-12-29 RX ORDER — LIDOCAINE HYDROCHLORIDE 10 MG/ML
3 INJECTION, SOLUTION EPIDURAL; INFILTRATION; INTRACAUDAL; PERINEURAL ONCE
Status: DISCONTINUED | OUTPATIENT
Start: 2021-12-29 | End: 2021-12-29 | Stop reason: HOSPADM

## 2021-12-29 RX ORDER — HYDROCODONE BITARTRATE AND ACETAMINOPHEN 5; 325 MG/1; MG/1
1 TABLET ORAL AS NEEDED
Status: DISCONTINUED | OUTPATIENT
Start: 2021-12-29 | End: 2021-12-29 | Stop reason: HOSPADM

## 2021-12-29 RX ORDER — SUCCINYLCHOLINE CHLORIDE 20 MG/ML
INJECTION INTRAMUSCULAR; INTRAVENOUS AS NEEDED
Status: DISCONTINUED | OUTPATIENT
Start: 2021-12-29 | End: 2021-12-29 | Stop reason: HOSPADM

## 2021-12-29 RX ORDER — PROMETHAZINE HYDROCHLORIDE 25 MG/ML
12.5 INJECTION, SOLUTION INTRAMUSCULAR; INTRAVENOUS
Status: DISCONTINUED | OUTPATIENT
Start: 2021-12-29 | End: 2021-12-29

## 2021-12-29 RX ORDER — SODIUM CHLORIDE 0.9 % (FLUSH) 0.9 %
5-40 SYRINGE (ML) INJECTION AS NEEDED
Status: DISCONTINUED | OUTPATIENT
Start: 2021-12-29 | End: 2021-12-29 | Stop reason: HOSPADM

## 2021-12-29 RX ORDER — ROPIVACAINE HYDROCHLORIDE 2 MG/ML
INJECTION, SOLUTION EPIDURAL; INFILTRATION; PERINEURAL
Status: COMPLETED | OUTPATIENT
Start: 2021-12-29 | End: 2021-12-29

## 2021-12-29 RX ORDER — OXYCODONE HYDROCHLORIDE 5 MG/1
5-15 TABLET ORAL
Status: DISCONTINUED | OUTPATIENT
Start: 2021-12-29 | End: 2021-12-30 | Stop reason: HOSPADM

## 2021-12-29 RX ORDER — SODIUM CHLORIDE 0.9 % (FLUSH) 0.9 %
5-40 SYRINGE (ML) INJECTION EVERY 8 HOURS
Status: DISCONTINUED | OUTPATIENT
Start: 2021-12-29 | End: 2021-12-29 | Stop reason: HOSPADM

## 2021-12-29 RX ORDER — NEOSTIGMINE METHYLSULFATE 1 MG/ML
INJECTION, SOLUTION INTRAVENOUS AS NEEDED
Status: DISCONTINUED | OUTPATIENT
Start: 2021-12-29 | End: 2021-12-29 | Stop reason: HOSPADM

## 2021-12-29 RX ORDER — ONDANSETRON 4 MG/1
4 TABLET, ORALLY DISINTEGRATING ORAL
Status: DISCONTINUED | OUTPATIENT
Start: 2021-12-29 | End: 2021-12-30 | Stop reason: HOSPADM

## 2021-12-29 RX ORDER — FENTANYL CITRATE 50 UG/ML
INJECTION, SOLUTION INTRAMUSCULAR; INTRAVENOUS AS NEEDED
Status: DISCONTINUED | OUTPATIENT
Start: 2021-12-29 | End: 2021-12-29 | Stop reason: HOSPADM

## 2021-12-29 RX ORDER — DEXAMETHASONE SODIUM PHOSPHATE 4 MG/ML
INJECTION, SOLUTION INTRA-ARTICULAR; INTRALESIONAL; INTRAMUSCULAR; INTRAVENOUS; SOFT TISSUE AS NEEDED
Status: DISCONTINUED | OUTPATIENT
Start: 2021-12-29 | End: 2021-12-29 | Stop reason: HOSPADM

## 2021-12-29 RX ORDER — FENTANYL CITRATE 50 UG/ML
25 INJECTION, SOLUTION INTRAMUSCULAR; INTRAVENOUS AS NEEDED
Status: DISCONTINUED | OUTPATIENT
Start: 2021-12-29 | End: 2021-12-29

## 2021-12-29 RX ORDER — FENTANYL CITRATE 50 UG/ML
25 INJECTION, SOLUTION INTRAMUSCULAR; INTRAVENOUS AS NEEDED
Status: DISCONTINUED | OUTPATIENT
Start: 2021-12-29 | End: 2021-12-29 | Stop reason: HOSPADM

## 2021-12-29 RX ORDER — DIPHENHYDRAMINE HCL 25 MG
25 CAPSULE ORAL
Status: DISCONTINUED | OUTPATIENT
Start: 2021-12-29 | End: 2021-12-30 | Stop reason: HOSPADM

## 2021-12-29 RX ORDER — MIDAZOLAM HYDROCHLORIDE 1 MG/ML
2 INJECTION, SOLUTION INTRAMUSCULAR; INTRAVENOUS ONCE
Status: COMPLETED | OUTPATIENT
Start: 2021-12-29 | End: 2021-12-29

## 2021-12-29 RX ORDER — MAGNESIUM SULFATE 100 %
4 CRYSTALS MISCELLANEOUS AS NEEDED
Status: DISCONTINUED | OUTPATIENT
Start: 2021-12-29 | End: 2021-12-29 | Stop reason: HOSPADM

## 2021-12-29 RX ORDER — AMOXICILLIN 250 MG
1 CAPSULE ORAL 2 TIMES DAILY
Status: DISCONTINUED | OUTPATIENT
Start: 2021-12-29 | End: 2021-12-30 | Stop reason: HOSPADM

## 2021-12-29 RX ORDER — DEXTROSE 50 % IN WATER (D50W) INTRAVENOUS SYRINGE
25-50 AS NEEDED
Status: DISCONTINUED | OUTPATIENT
Start: 2021-12-29 | End: 2021-12-29 | Stop reason: HOSPADM

## 2021-12-29 RX ORDER — PREGABALIN 150 MG/1
150 CAPSULE ORAL
COMMUNITY
End: 2022-11-03

## 2021-12-29 RX ORDER — ASPIRIN 325 MG
325 TABLET ORAL DAILY
Status: DISCONTINUED | OUTPATIENT
Start: 2021-12-30 | End: 2021-12-30 | Stop reason: HOSPADM

## 2021-12-29 RX ORDER — INSULIN LISPRO 100 [IU]/ML
INJECTION, SOLUTION INTRAVENOUS; SUBCUTANEOUS ONCE
Status: DISCONTINUED | OUTPATIENT
Start: 2021-12-29 | End: 2021-12-29 | Stop reason: HOSPADM

## 2021-12-29 RX ORDER — FENTANYL CITRATE 50 UG/ML
100 INJECTION, SOLUTION INTRAMUSCULAR; INTRAVENOUS ONCE
Status: COMPLETED | OUTPATIENT
Start: 2021-12-29 | End: 2021-12-29

## 2021-12-29 RX ORDER — HYDROMORPHONE HYDROCHLORIDE 1 MG/ML
1 INJECTION, SOLUTION INTRAMUSCULAR; INTRAVENOUS; SUBCUTANEOUS
Status: DISCONTINUED | OUTPATIENT
Start: 2021-12-29 | End: 2021-12-30 | Stop reason: HOSPADM

## 2021-12-29 RX ORDER — FACIAL-BODY WIPES
10 EACH TOPICAL DAILY PRN
Status: DISCONTINUED | OUTPATIENT
Start: 2021-12-29 | End: 2021-12-30 | Stop reason: HOSPADM

## 2021-12-29 RX ORDER — PREGABALIN 75 MG/1
150 CAPSULE ORAL 3 TIMES DAILY
Status: DISCONTINUED | OUTPATIENT
Start: 2021-12-29 | End: 2021-12-30 | Stop reason: HOSPADM

## 2021-12-29 RX ORDER — FAMOTIDINE 20 MG/1
20 TABLET, FILM COATED ORAL ONCE
Status: COMPLETED | OUTPATIENT
Start: 2021-12-29 | End: 2021-12-29

## 2021-12-29 RX ORDER — LISINOPRIL 10 MG/1
10 TABLET ORAL DAILY
Status: DISCONTINUED | OUTPATIENT
Start: 2021-12-30 | End: 2021-12-30 | Stop reason: HOSPADM

## 2021-12-29 RX ORDER — SODIUM CHLORIDE 0.9 % (FLUSH) 0.9 %
5-40 SYRINGE (ML) INJECTION EVERY 8 HOURS
Status: DISCONTINUED | OUTPATIENT
Start: 2021-12-29 | End: 2021-12-30 | Stop reason: HOSPADM

## 2021-12-29 RX ORDER — ROCURONIUM BROMIDE 10 MG/ML
INJECTION, SOLUTION INTRAVENOUS AS NEEDED
Status: DISCONTINUED | OUTPATIENT
Start: 2021-12-29 | End: 2021-12-29 | Stop reason: HOSPADM

## 2021-12-29 RX ORDER — HYDROMORPHONE HYDROCHLORIDE 2 MG/ML
INJECTION, SOLUTION INTRAMUSCULAR; INTRAVENOUS; SUBCUTANEOUS AS NEEDED
Status: DISCONTINUED | OUTPATIENT
Start: 2021-12-29 | End: 2021-12-29 | Stop reason: HOSPADM

## 2021-12-29 RX ORDER — SODIUM CHLORIDE, SODIUM LACTATE, POTASSIUM CHLORIDE, CALCIUM CHLORIDE 600; 310; 30; 20 MG/100ML; MG/100ML; MG/100ML; MG/100ML
75 INJECTION, SOLUTION INTRAVENOUS CONTINUOUS
Status: DISCONTINUED | OUTPATIENT
Start: 2021-12-29 | End: 2021-12-29 | Stop reason: HOSPADM

## 2021-12-29 RX ORDER — GLYCOPYRROLATE 0.2 MG/ML
INJECTION INTRAMUSCULAR; INTRAVENOUS AS NEEDED
Status: DISCONTINUED | OUTPATIENT
Start: 2021-12-29 | End: 2021-12-29 | Stop reason: HOSPADM

## 2021-12-29 RX ORDER — SODIUM CHLORIDE 0.9 % (FLUSH) 0.9 %
5-40 SYRINGE (ML) INJECTION AS NEEDED
Status: DISCONTINUED | OUTPATIENT
Start: 2021-12-29 | End: 2021-12-30 | Stop reason: HOSPADM

## 2021-12-29 RX ORDER — FENTANYL CITRATE 50 UG/ML
50 INJECTION, SOLUTION INTRAMUSCULAR; INTRAVENOUS
Status: DISCONTINUED | OUTPATIENT
Start: 2021-12-29 | End: 2021-12-29 | Stop reason: HOSPADM

## 2021-12-29 RX ORDER — SODIUM CHLORIDE, SODIUM LACTATE, POTASSIUM CHLORIDE, CALCIUM CHLORIDE 600; 310; 30; 20 MG/100ML; MG/100ML; MG/100ML; MG/100ML
100 INJECTION, SOLUTION INTRAVENOUS CONTINUOUS
Status: DISCONTINUED | OUTPATIENT
Start: 2021-12-29 | End: 2021-12-30 | Stop reason: HOSPADM

## 2021-12-29 RX ADMIN — TRANEXAMIC ACID 1 G: 100 INJECTION, SOLUTION INTRAVENOUS at 09:45

## 2021-12-29 RX ADMIN — ROCURONIUM BROMIDE 10 MG: 50 INJECTION INTRAVENOUS at 08:13

## 2021-12-29 RX ADMIN — FENTANYL CITRATE 50 MCG: 50 INJECTION, SOLUTION INTRAMUSCULAR; INTRAVENOUS at 08:21

## 2021-12-29 RX ADMIN — SODIUM CHLORIDE, SODIUM LACTATE, POTASSIUM CHLORIDE, AND CALCIUM CHLORIDE 100 ML/HR: 600; 310; 30; 20 INJECTION, SOLUTION INTRAVENOUS at 14:14

## 2021-12-29 RX ADMIN — FENTANYL CITRATE 100 MCG: 50 INJECTION INTRAMUSCULAR; INTRAVENOUS at 07:11

## 2021-12-29 RX ADMIN — FENTANYL CITRATE 25 MCG: 50 INJECTION, SOLUTION INTRAMUSCULAR; INTRAVENOUS at 07:54

## 2021-12-29 RX ADMIN — HYDROMORPHONE HYDROCHLORIDE 0.4 MG: 2 INJECTION, SOLUTION INTRAMUSCULAR; INTRAVENOUS; SUBCUTANEOUS at 09:01

## 2021-12-29 RX ADMIN — FENTANYL CITRATE 50 MCG: 50 INJECTION, SOLUTION INTRAMUSCULAR; INTRAVENOUS at 08:17

## 2021-12-29 RX ADMIN — SUCCINYLCHOLINE CHLORIDE 100 MG: 20 INJECTION, SOLUTION INTRAMUSCULAR; INTRAVENOUS at 07:54

## 2021-12-29 RX ADMIN — OXYCODONE HYDROCHLORIDE 10 MG: 5 TABLET ORAL at 20:40

## 2021-12-29 RX ADMIN — HYDROMORPHONE HYDROCHLORIDE 0.4 MG: 2 INJECTION, SOLUTION INTRAMUSCULAR; INTRAVENOUS; SUBCUTANEOUS at 08:40

## 2021-12-29 RX ADMIN — PHENYLEPHRINE HYDROCHLORIDE 100 MCG: 10 INJECTION INTRAVENOUS at 09:53

## 2021-12-29 RX ADMIN — ONDANSETRON 4 MG: 4 TABLET, ORALLY DISINTEGRATING ORAL at 20:39

## 2021-12-29 RX ADMIN — WATER 2 G: 1 INJECTION INTRAMUSCULAR; INTRAVENOUS; SUBCUTANEOUS at 08:10

## 2021-12-29 RX ADMIN — ROPIVACAINE HYDROCHLORIDE 60 MG: 2 INJECTION, SOLUTION EPIDURAL; INFILTRATION at 07:13

## 2021-12-29 RX ADMIN — ONDANSETRON 4 MG: 4 TABLET, ORALLY DISINTEGRATING ORAL at 14:36

## 2021-12-29 RX ADMIN — OXYCODONE HYDROCHLORIDE 15 MG: 5 TABLET ORAL at 14:12

## 2021-12-29 RX ADMIN — Medication 10 ML: at 22:04

## 2021-12-29 RX ADMIN — SENNOSIDES AND DOCUSATE SODIUM 1 TABLET: 50; 8.6 TABLET ORAL at 19:04

## 2021-12-29 RX ADMIN — FAMOTIDINE 20 MG: 20 TABLET ORAL at 06:26

## 2021-12-29 RX ADMIN — DEXAMETHASONE SODIUM PHOSPHATE 4 MG: 4 INJECTION, SOLUTION INTRAMUSCULAR; INTRAVENOUS at 09:38

## 2021-12-29 RX ADMIN — SODIUM CHLORIDE, SODIUM LACTATE, POTASSIUM CHLORIDE, AND CALCIUM CHLORIDE 75 ML/HR: 600; 310; 30; 20 INJECTION, SOLUTION INTRAVENOUS at 06:26

## 2021-12-29 RX ADMIN — TRANEXAMIC ACID 1 G: 100 INJECTION, SOLUTION INTRAVENOUS at 08:10

## 2021-12-29 RX ADMIN — ROCURONIUM BROMIDE 30 MG: 50 INJECTION INTRAVENOUS at 07:58

## 2021-12-29 RX ADMIN — PHENYLEPHRINE HYDROCHLORIDE 100 MCG: 10 INJECTION INTRAVENOUS at 09:42

## 2021-12-29 RX ADMIN — Medication 3 MG: at 10:15

## 2021-12-29 RX ADMIN — HYDROMORPHONE HYDROCHLORIDE 0.2 MG: 2 INJECTION, SOLUTION INTRAMUSCULAR; INTRAVENOUS; SUBCUTANEOUS at 10:35

## 2021-12-29 RX ADMIN — FENTANYL CITRATE 25 MCG: 50 INJECTION, SOLUTION INTRAMUSCULAR; INTRAVENOUS at 08:13

## 2021-12-29 RX ADMIN — PROPOFOL 200 MG: 10 INJECTION, EMULSION INTRAVENOUS at 07:54

## 2021-12-29 RX ADMIN — WATER 2 G: 1 INJECTION INTRAMUSCULAR; INTRAVENOUS; SUBCUTANEOUS at 16:18

## 2021-12-29 RX ADMIN — GLYCOPYRROLATE 0.4 MG: 0.2 INJECTION INTRAMUSCULAR; INTRAVENOUS at 10:15

## 2021-12-29 RX ADMIN — HYDROMORPHONE HYDROCHLORIDE 1 MG: 2 INJECTION, SOLUTION INTRAMUSCULAR; INTRAVENOUS; SUBCUTANEOUS at 08:26

## 2021-12-29 RX ADMIN — MIDAZOLAM 2 MG: 1 INJECTION INTRAMUSCULAR; INTRAVENOUS at 07:11

## 2021-12-29 RX ADMIN — PREGABALIN 150 MG: 75 CAPSULE ORAL at 22:00

## 2021-12-29 RX ADMIN — ROPIVACAINE HYDROCHLORIDE 30 ML: 2 INJECTION, SOLUTION EPIDURAL; INFILTRATION at 07:11

## 2021-12-29 NOTE — ROUTINE PROCESS
TRANSFER - OUT REPORT:    Verbal report given to Gama SCALES on Danette Simpson  being transferred to [address] [facility] for routine post - op       Report consisted of patients Situation, Background, Assessment and   Recommendations(SBAR). Information from the following report(s) SBAR, Kardex, OR Summary, Intake/Output, MAR and Recent Results was reviewed with the receiving nurse. Lines:   Peripheral IV 12/29/21 Right Arm (Active)   Site Assessment Clean, dry, & intact 12/29/21 1035   Phlebitis Assessment 0 12/29/21 1035   Infiltration Assessment 0 12/29/21 1035   Dressing Status Clean, dry, & intact 12/29/21 1035   Dressing Type Tape;Transparent 12/29/21 1035   Hub Color/Line Status Pink; Infusing 12/29/21 1035   Alcohol Cap Used No 12/29/21 0620        Opportunity for questions and clarification was provided.       Patient transported with:   [employer]

## 2021-12-29 NOTE — PROGRESS NOTES
Problem: Mobility Impaired (Adult and Pediatric)  Goal: *Acute Goals and Plan of Care (Insert Text)  Description: Physical Therapy Goals  Initiated 12/29/2021 and to be accomplished within 7 day(s)  1. Patient will move from supine to sit and sit to supine , scoot up and down, and roll side to side in bed with modified independence. 2.  Patient will transfer from bed to chair and chair to bed with modified independence using the least restrictive device. 3.  Patient will perform sit to stand with modified independence. 4.  Patient will ambulate with modified independence for 200 feet with the least restrictive device. 5.  Patient will ascend/descend 4 stairs with L handrail(s) with modified independence. PLOF: Pt independent at home with  in 2 story house with 2 SYLVESTER. Outcome: Progressing Towards Goal   PHYSICAL THERAPY EVALUATION    Patient: Chelsi Simpson (46 y.o. female)  Date: 12/29/2021  Primary Diagnosis: Osteoarthritis of left knee, unspecified osteoarthritis type [M17.12]  Knee osteoarthritis [M17.10]  Procedure(s) (LRB):  LEFT TOTAL KNEE REPLACEMENT/DEPUY/2 SA'S/ADDUCTOR BLOCK (Left) Day of Surgery   Precautions:  WBAT      ASSESSMENT :  Pt cleared to participate in PT session, pt received semi-reclined in bed and agreeable to therapy session. Based on the objective data described below, the patient presents with decreased endurance, decreased strength, decreased balance reactions, gait deviations, and decreased independence in functional mobility. Patient is 45 yo female admitted to hospital for TKA. Patient was educated on weight bearing status, role of therapy, TE (see below), and equipment in room including role of SCDs, towel roll, and ice sleeve. Patient demonstrated appropriate SLR and transferred to sitting EOB for objective assessment with supervision. Patient was given demo with instruction on sit <> stand transfer and gait training.  Patient transferred to standing with SBA and ambulated x200 feet with SBA and RW. Pt also ascending/descending 12 steps with step to gait pattern and unilateral handrail. Pt utilizing side stepping to descend with 1 handrail and SBA. At conclusion of session patient transferred to sitting in recliner and was left resting with call bell by the side. Pt positioned for comfort and educated to call for assist before getting up, pt verbalized understanding. Pt left with all needs met and call bell in reach. RN notified of position and participation. Pt cleared for discharge home with RW and Home Health with family support as needed. Pt will be kept on caseload as she is staying for 1 night. Patient will benefit from skilled intervention to address the above impairments. Patient's rehabilitation potential is considered to be Good  Factors which may influence rehabilitation potential include:   [x]         None noted  []         Mental ability/status  []         Medical condition  []         Home/family situation and support systems  []         Safety awareness  []         Pain tolerance/management  []         Other:      PLAN :  Recommendations and Planned Interventions:   [x]           Bed Mobility Training             []    Neuromuscular Re-Education  [x]           Transfer Training                   []    Orthotic/Prosthetic Training  [x]           Gait Training                          []    Modalities  [x]           Therapeutic Exercises           []    Edema Management/Control  [x]           Therapeutic Activities            [x]    Family Training/Education  [x]           Patient Education  []           Other (comment):    Frequency/Duration: Patient will be followed by physical therapy 1-2 times per day/4-7 days per week to address goals. Discharge Recommendations: Home Health  Further Equipment Recommendations for Discharge: RW      SUBJECTIVE:   Patient stated I want to stay the night, just in case.     OBJECTIVE DATA SUMMARY:     Past Medical History:   Diagnosis Date    Depression     GERD (gastroesophageal reflux disease)     Hypertension     IBS (irritable bowel syndrome)     Insomnia     Migraines     MS (multiple sclerosis) ([facility])     Prediabetes     Sleep apnea     mild, no CPAP    Urinary urgency     Vision decreased      Past Surgical History:   Procedure Laterality Date    HX  SECTION      x1    HX GYN  2002    tubal ligation    HX HYSTERECTOMY  14    OK BREAST SURGERY PROCEDURE UNLISTED      reduction     Barriers to Learning/Limitations: None  Compensate with: N/A  Home Situation:  Home Situation  Home Environment: Private residence  # Steps to Enter: 2  One/Two Story Residence: Two story  # of Interior Steps: 12  Interior Rails: Both  Living Alone: No  Support Systems: Spouse/Significant Other  Patient Expects to be Discharged toF Cor[de-identified]ration  Current DME Used/Available at Home: None  Critical Behavior:  Neurologic State: Alert  Orientation Level: Oriented X4  Cognition: Appropriate decision making  Safety/Judgement: Awareness of environment; Fall prevention  Psychosocial  Patient Behaviors: Calm; Cooperative    Strength:    Strength: Generally decreased, functional (LLE, RLE WNL )    Tone & Sensation:   Tone: Normal    Sensation: Intact    Range Of Motion:  AROM: Generally decreased, functional (LLE )    Posture:  Posture (WDL): Within defined limits     Functional Mobility:  Bed Mobility:     Supine to Sit: Supervision  Sit to Supine: Supervision  Scooting: Supervision  Transfers:  Sit to Stand: Stand-by assistance  Stand to Sit: Stand-by assistance    Balance:   Sitting: Intact  Standing: Impaired; With support  Standing - Static: Good  Standing - Dynamic : Fair    Ambulation/Gait Training:  Distance (ft): 200 Feet (ft)  Assistive Device: Walker, rolling  Ambulation - Level of Assistance: Stand-by assistance     Gait Description (WDL): Exceptions to WDL  Gait Abnormalities: Decreased step clearance     Left Side Weight Bearing: As tolerated  Base of Support: Shift to right     Speed/[place]cca: Slow  Step Length: Right shortened;Left shortened    Stairs:  Number of Stairs Trained: 12  Stairs - Level of Assistance: Stand-by assistance  Rail Use: Left      Therapeutic Exercises:   Instructed in and completed the following: ankle pumps, heel slides, and LAQ    Pain:  Pain level pre-treatment: 4/10   Pain level post-treatment: 4/10   Pain Intervention(s) :Rest, Repositioning  Response to intervention: Nurse notified    Activity Tolerance:   Good activity tolerance     Please refer to the flowsheet for vital signs taken during this treatment. After treatment:   [x]         Patient left in no apparent distress sitting up in chair  []         Patient left in no apparent distress in bed  [x]         Call bell left within reach  [x]         Nursing notified  []         Caregiver present  []         Bed alarm activated  []         SCDs applied    COMMUNICATION/EDUCATION:   [x]         Role of Physical Therapy in the acute care setting. [x]         Fall prevention education was provided and the patient/caregiver indicated understanding. [x]         Patient/family have participated as able in goal setting and plan of care. [x]         Patient/family agree to work toward stated goals and plan of care. []         Patient understands intent and goals of therapy, but is neutral about his/her participation. []         Patient is unable to participate in goal setting/plan of care: ongoing with therapy staff.  []         Other:     Thank you for this referral.  Joyce Grover, PT   Time Calculation: 31 mins      Eval Complexity: History: LOW Complexity : Zero comorbidities / personal factors that will impact the outcome / POCExam:LOW Complexity : 1-2 Standardized tests and measures addressing body structure, function, activity limitation and / or participation in recreation  Presentation: LOW Complexity : Stable, uncomplicated  Clinical Decision Making:Low Complexity low  Overall Complexity:LOW

## 2021-12-29 NOTE — ANESTHESIA PREPROCEDURE EVALUATION
Anesthetic History   No history of anesthetic complications            Review of Systems / Medical History  Patient summary reviewed, nursing notes reviewed and pertinent labs reviewed    Pulmonary        Sleep apnea: No treatment           Neuro/Psych             Comments: MS Cardiovascular    Hypertension          Hyperlipidemia         GI/Hepatic/Renal     GERD           Endo/Other        Obesity     Other Findings            Physical Exam    Airway  Mallampati: II  TM Distance: 4 - 6 cm  Neck ROM: normal range of motion   Mouth opening: Normal     Cardiovascular    Rhythm: regular           Dental    Dentition: Upper partial plate     Pulmonary  Breath sounds clear to auscultation               Abdominal  GI exam deferred       Other Findings            Anesthetic Plan    ASA: 3  Anesthesia type: general and regional - saphenous block            Anesthetic plan and risks discussed with: Patient

## 2021-12-29 NOTE — ANESTHESIA PROCEDURE NOTES
Peripheral Block    Start time: 12/29/2021 7:03 AM  End time: 12/29/2021 7:13 AM  Performed by: Moses Moody MD  Authorized by: Moses Moody MD       Pre-procedure: Indications: at surgeon's request and post-op pain management    Preanesthetic Checklist: patient identified, risks and benefits discussed, site marked, timeout performed, anesthesia consent given and patient being monitored    Timeout Time: 07:03 EST          Block Type:   Block Type:   Adductor canal block  Laterality:  Left  Monitoring:  Standard ASA monitoring, continuous pulse ox, frequent vital sign checks, heart rate, oxygen and responsive to questions  Injection Technique:  Single shot  Procedures: ultrasound guided    Patient Position: supine  Prep: chlorhexidine    Location:  Mid thigh  Needle Type:  Stimuplex  Needle Gauge:  21 G  Needle Localization:  Ultrasound guidance  Medication Injected:  Ropivacaine (NAROPIN) 2 mg/mL (0.2 %) injection, 60 mg  Med Admin Time: 12/29/2021 7:13 AM    Assessment:  Number of attempts:  1  Injection Assessment:  Incremental injection every 5 mL, local visualized surrounding nerve on ultrasound, negative aspiration for blood, no paresthesia, no intravascular symptoms and ultrasound image on chart  Patient tolerance:  Patient tolerated the procedure well with no immediate complications

## 2021-12-29 NOTE — PROGRESS NOTES
Reason for Admission:  Osteoarthritis of left knee, unspecified osteoarthritis type [M17.12]  Knee osteoarthritis [M17.10]                 RUR Score:    2%            Plan for utilizing home health:    Yes, [address] signed for [facility]                      Likelihood of Readmission:   LOW                         Transition of Care Plan:              Initial assessment completed with patient and spouse/SO. Cognitive status of patient: oriented to time, place, person and situation. Face sheet information confirmed:  yes. The patient designates , Keyon Sarmiento to participate in her discharge plan and to receive any needed information. This patient lives in a single family home with . Patient is not able to navigate steps as needed. Prior to hospitalization, patient was considered to be independent with ADLs/IADLS : yes . Patient has a current ACP document on file: yes      Healthcare Decision Maker:     Click here to complete [employer] including selection of the Healthcare Decision Maker Relationship (ie \"Primary\")    The  will be available to transport patient home upon discharge. The patient has no DME available in the home other than a built in shower bench. Patient is not currently active with home health. Patient has not stayed in a skilled nursing facility or rehab. This patient is on dialysis :no    List of available Home Health agencies were provided and reviewed with the patient prior to discharge. Freedom of choice signed: yes, for [address] Currently, the discharge plan is Home with [address] [place] [address] Zafar Martinez. The patient states that she can obtain her medications from the pharmacy, and take her medications as directed. Patient's current insurance is Medicare and        Care Management Interventions  PCP Verified by CM:  Yes  Mode of Transport at Discharge: Self  Transition of Care Consult (CM Consult): [address]: Yes  Support Systems: Spouse/Significant Other  Confirm Follow Up Transport: Family  The Patient and/or Patient Representative was Provided with a Choice of Provider and Agrees with the Discharge Plan?: Yes  Freedom of Choice List was Provided with Basic Dialogue that Supports the Patient's Individualized Plan of Care/Goals, Treatment Preferences and Shares the Quality Data Associated with the Providers?: Yes  Discharge Location  Discharge Placement: Home with home health        Tiffanie Dow RN - Outcomes Manager  [phone]

## 2021-12-29 NOTE — PROGRESS NOTES
Problem: Pain  Goal: *Control of Pain  12/29/2021 1433 by Ángel Kim RN  Outcome: Progressing Towards Goal  12/29/2021 1420 by Ángel Kim RN  Outcome: Progressing Towards Goal  Goal: *PALLIATIVE CARE:  Alleviation of Pain  12/29/2021 1433 by Ángel Kim RN  Outcome: Progressing Towards Goal  12/29/2021 1420 by Ángel Kim RN  Outcome: Progressing Towards Goal     Problem: Patient Education: Go to Patient Education Activity  Goal: Patient/Family Education  12/29/2021 1433 by Ángel Kim RN  Outcome: Progressing Towards Goal  12/29/2021 1420 by Ángel Kim RN  Outcome: Progressing Towards Goal

## 2021-12-30 VITALS
TEMPERATURE: 98.8 F | RESPIRATION RATE: 16 BRPM | BODY MASS INDEX: 32.58 KG/M2 | SYSTOLIC BLOOD PRESSURE: 138 MMHG | WEIGHT: 220 LBS | OXYGEN SATURATION: 98 % | HEIGHT: 69 IN | DIASTOLIC BLOOD PRESSURE: 72 MMHG | HEART RATE: 81 BPM

## 2021-12-30 PROCEDURE — 74011250637 HC RX REV CODE- 250/637: Performed by: ORTHOPAEDIC SURGERY

## 2021-12-30 PROCEDURE — 74011000250 HC RX REV CODE- 250: Performed by: ORTHOPAEDIC SURGERY

## 2021-12-30 PROCEDURE — 97165 OT EVAL LOW COMPLEX 30 MIN: CPT

## 2021-12-30 PROCEDURE — 97535 SELF CARE MNGMENT TRAINING: CPT

## 2021-12-30 PROCEDURE — 74011250636 HC RX REV CODE- 250/636: Performed by: ORTHOPAEDIC SURGERY

## 2021-12-30 PROCEDURE — 2709999900 HC NON-CHARGEABLE SUPPLY

## 2021-12-30 RX ADMIN — ASPIRIN 325 MG ORAL TABLET 325 MG: 325 PILL ORAL at 08:08

## 2021-12-30 RX ADMIN — ONDANSETRON 4 MG: 4 TABLET, ORALLY DISINTEGRATING ORAL at 06:52

## 2021-12-30 RX ADMIN — LISINOPRIL 10 MG: 10 TABLET ORAL at 08:08

## 2021-12-30 RX ADMIN — SENNOSIDES AND DOCUSATE SODIUM 1 TABLET: 50; 8.6 TABLET ORAL at 08:08

## 2021-12-30 RX ADMIN — OXYCODONE HYDROCHLORIDE 5 MG: 5 TABLET ORAL at 06:52

## 2021-12-30 RX ADMIN — Medication 10 ML: at 07:04

## 2021-12-30 RX ADMIN — WATER 2 G: 1 INJECTION INTRAMUSCULAR; INTRAVENOUS; SUBCUTANEOUS at 00:15

## 2021-12-30 RX ADMIN — OXYCODONE HYDROCHLORIDE 10 MG: 5 TABLET ORAL at 10:25

## 2021-12-30 RX ADMIN — PREGABALIN 150 MG: 75 CAPSULE ORAL at 08:08

## 2021-12-30 NOTE — PROGRESS NOTES
Discharge/Transition Planning    Patient accepted to [facility] for post op TKR protocol . Updated AVS    1115: Rolling Walker delivered to patient and pt signed Guido Comment papers.  Spouse in room to transport home

## 2021-12-30 NOTE — PROGRESS NOTES
Discharge teaching completed at bedside with patient. Opportunity provided for clarifying questions. All answered to patient satisfaction. IV removed.  ID removed and shredded

## 2021-12-30 NOTE — PROGRESS NOTES
Problem: Pain  Goal: *Control of Pain  Outcome: Progressing Towards Goal  Goal: *PALLIATIVE CARE:  Alleviation of Pain  Outcome: Progressing Towards Goal     Problem: Patient Education: Go to Patient Education Activity  Goal: Patient/Family Education  Outcome: Progressing Towards Goal     Problem: Patient Education: Go to Patient Education Activity  Goal: Patient/Family Education  Outcome: Progressing Towards Goal     Problem: Patient Education: Go to Patient Education Activity  Goal: Patient/Family Education  Outcome: Progressing Towards Goal     Problem: Knee Replacement: Day of Surgery/Unit  Goal: Off Pathway (Use only if patient is Off Pathway)  Outcome: Progressing Towards Goal  Goal: Activity/Safety  Outcome: Progressing Towards Goal  Goal: Consults, if ordered  Outcome: Progressing Towards Goal  Goal: Diagnostic Test/Procedures  Outcome: Progressing Towards Goal  Goal: Nutrition/Diet  Outcome: Progressing Towards Goal  Goal: Medications  Outcome: Progressing Towards Goal  Goal: Respiratory  Outcome: Progressing Towards Goal  Goal: Treatments/Interventions/Procedures  Outcome: Progressing Towards Goal  Goal: Psychosocial  Outcome: Progressing Towards Goal  Goal: *Initiate mobility  Outcome: Progressing Towards Goal  Goal: *Optimal pain control at patient's stated goal  Outcome: Progressing Towards Goal  Goal: *Hemodynamically stable  Outcome: Progressing Towards Goal     Problem: Falls - Risk of  Goal: *Absence of Falls  Description: Document Ramy Fall Risk and appropriate interventions in the flowsheet.   Outcome: Progressing Towards Goal  Note: Fall Risk Interventions:  Mobility Interventions: Assess mobility with egress test,Bed/chair exit alarm,Communicate number of staff needed for ambulation/transfer,OT consult for ADLs,Patient to call before getting OOB,PT Consult for mobility concerns,PT Consult for assist device competence,Strengthening exercises (ROM-active/passive),Utilize walker, cane, or other assistive device         Medication Interventions: Assess postural VS orthostatic hypotension,Bed/chair exit alarm,Evaluate medications/consider consulting pharmacy,Patient to call before getting OOB                   Problem: Patient Education: Go to Patient Education Activity  Goal: Patient/Family Education  Outcome: Progressing Towards Goal

## 2021-12-30 NOTE — PROGRESS NOTES
OCCUPATIONAL THERAPY EVALUATION/DISCHARGE    Patient: Lo Simpson (46 y.o. female)  Date: 2021  Primary Diagnosis: Osteoarthritis of left knee, unspecified osteoarthritis type [M17.12]  Knee osteoarthritis [M17.10]  Procedure(s) (LRB):  LEFT TOTAL KNEE REPLACEMENT/DEPUY/2 SA'S/ADDUCTOR BLOCK (Left) 1 Day Post-Op   Precautions:   WBAT  PLOF: Pt reports being independent with ADLs and functional mobility w/o AD. ASSESSMENT AND RECOMMENDATIONS:  Based on the objective data described below, the patient demonstrates ability perform basic self care tasks and functional transfers without physical assistance. Pt benefits from occasional Supervision for functional mobility with FWW. Patient has a supportive spouse at home to assist her prn and all needed DME (raised toilet, shower chair) for BR safety. Pt performed/simulated all seated and std ADLs with Mod Ind, requesting to return to bed at the end of the session due to pain after recently walking in the hallway with her spouse, per pt she just received pain medication prior to OT session. Pt positioned for comfort in bed, all needs within reach. Pt educated on use of long handled sponge for LB bathing at home (issued to pt), pt demos understanding of proper use of AE. Skilled occupational therapy is not indicated at this time. Discharge Recommendations: Home Health  Further Equipment Recommendations for Discharge: rolling walker      SUBJECTIVE:   Patient stated I am ready to go home.     OBJECTIVE DATA SUMMARY:     Past Medical History:   Diagnosis Date    Depression     GERD (gastroesophageal reflux disease)     Hypertension     IBS (irritable bowel syndrome)     Insomnia     Migraines     [facility])     Prediabetes     Sleep apnea     mild, no CPAP    Urinary urgency     Vision decreased      Past Surgical History:   Procedure Laterality Date    HX  SECTION      x1    HX GYN  2002    tubal ligation    HX HYSTERECTOMY  1/8/14    RI BREAST SURGERY PROCEDURE UNLISTED      reduction     Barriers to Learning/Limitations: None  Compensate with: visual, verbal, tactile, kinesthetic cues/model    Home Situation:   Home Situation  Home Environment: Private residence  # Steps to Enter: 2  One/Two Story Residence: Two story  # of Interior Steps: 16  Interior Rails: Both  Living Alone: No  Support Systems: Spouse/Significant Other  Patient Expects to be Discharged toVF Cor[de-identified]ration  Current DME Used/Available at Home: None  Tub or Shower Type: Shower  [x]     Right hand dominant   []     Left hand dominant    Cognitive/Behavioral Status:  Neurologic State: Alert  Orientation Level: Oriented X4  Cognition: Appropriate decision making; Follows commands  Safety/Judgement: Awareness of environment    Skin: visible skin intact  Edema: none noted    Vision/Perceptual:       Acuity: Able to read clock/calendar on wall without difficulty     Coordination: BUE  Coordination: Within functional limits  Fine Motor Skills-Upper: Left Intact; Right Intact    Gross Motor Skills-Upper: Left Intact; Right Intact    Balance:  Sitting: Intact  Standing: Intact; With support  Standing - Static: Good  Standing - Dynamic : Good    Strength: BUE  Strength:  Within functional limits   Tone & Sensation: BUE  Tone: Normal  Sensation: Intact   Range of Motion: BUE  AROM: Within functional limits   Functional Mobility and Transfers for ADLs:  Bed Mobility:     Supine to Sit: Modified independent  Sit to Supine: Modified independent     Transfers:  Sit to Stand: Modified independent  Stand to Sit: Modified independent   Toilet Transfer : Modified independent    Bathroom Mobility: Supervision/set up  ADL Assessment:  Feeding: Setup;Modified independent  Oral Facial Hygiene/Grooming: Modified Independent  Bathing: Modified independent  Upper Body Dressing: Modified independent  Lower Body Dressing: Modified independent  Toileting: Modified independent   ADL Intervention:   Cognitive Retraining  Safety/Judgement: Awareness of environment  Pain:  Pain level pre-treatment: 6/10   Pain level post-treatment: 6/10   Activity Tolerance:   Good  Please refer to the flowsheet for vital signs taken during this treatment. After treatment:   []  Patient left in no apparent distress sitting up in chair  [x]  Patient left in no apparent distress in bed  [x]  Call bell left within reach  [x]  Nursing notified  []  Caregiver present  []  Bed alarm activated    COMMUNICATION/EDUCATION:   [x]      Role of Occupational Therapy in the acute care setting  [x]      Home safety education was provided and the patient/caregiver indicated understanding. [x]      Patient/family have participated as able and agree with findings and recommendations. []      Patient is unable to participate in plan of care at this time. Thank you for this referral.  Pat Sanabria OTR/L  Time Calculation: 22 mins      Eval Complexity: History: LOW Complexity : Brief history review ; Examination: LOW Complexity : 1-3 performance deficits relating to physical, cognitive , or psychosocial skils that result in activity limitations and / or participation restrictions ;    Decision Making:LOW Complexity : No comorbidities that affect functional and no verbal or physical assistance needed to complete eval tasks

## 2021-12-30 NOTE — OP NOTES
[address] [place] [address]   OPERATIVE REPORT    Name:  Tera Woody  MR#:   [id number]  :  1970  ACCOUNT #:  [de-identified]  DATE OF SERVICE:  2021    PREOPERATIVE DIAGNOSIS:  Osteoarthritis, left knee. POSTOPERATIVE DIAGNOSIS:  Osteoarthritis, left knee. PROCEDURE PERFORMED:  Left total knee replacement using the DePuy Attune posterior-stabilized rotating platform cemented system. SURGEON:  Johnny Resendiz MD.    Rhea Thurman. ANESTHESIA:  General with adductor block. COMPLICATIONS:  0.    SPECIMENS REMOVED:  None. IMPLANTS:  Attune posterior-stabilized size 7 femur; Attune size 6 tibial base; Attune patella dome, 38 mm; Attune rotating platform, posterior-stabilized tibial insert, 7 mm. ESTIMATED BLOOD LOSS:  100 mL. SUMMARY OF PROCEDURE:  After general anesthesia was induced, the patient's left knee was examined. This showed a mild flexion contracture and large effusion. The knee was prepped and draped in the routine sterile fashion. The limb was exsanguinated by elevation and the upper thigh tourniquet inflated to 350 mmHg. A midline incision was made and a medial parapatellar arthrotomy performed. There was eburnated bone over the weightbearing surface of the medial femoral condyle and throughout the medial tibial plateau. There were large patellofemoral osteophytes with significant cartilage erosions over the patella and patchy eburnated bone over the lateral tibial plateau. Using alignment guides, a distal femoral cut and proximal tibial cut were made and the femur and tibia sized for implants. The femur and tibia were further fashioned to accept implants and a trial showed full motion and full stability. The patella was then cut and sized and the trial patella showed good patellofemoral congruence. The implants were cemented with antibiotic-impregnated cement, after which several tibial trials were performed and the true tibial insert placed.   At this time, there was full motion, full stability, and full patellofemoral congruence. The tourniquet was deflated and hemostasis achieved with electrocautery. The wound was closed in layers and a bulky dressing applied.         MD BERENICE Lucia [id number]  D:  12/29/2021 9:54  T:  12/30/2021 0:09  JOB #:  [id number]

## 2021-12-30 NOTE — PROGRESS NOTES
Attempted to see patient for PT treatment and she politely declines. Patient has been ambulating using RW on her own in the hallway last night and this morning. She has also practiced the stairs several times. She has no mobility concerns at this time.      Ana Del Angel PT, DPT

## 2021-12-30 NOTE — PROGRESS NOTES
Problem: Pain  Goal: *Control of Pain  Outcome: Progressing Towards Goal  Goal: *PALLIATIVE CARE:  Alleviation of Pain  Outcome: Progressing Towards Goal     Problem: Patient Education: Go to Patient Education Activity  Goal: Patient/Family Education  Outcome: Progressing Towards Goal     Problem: Patient Education: Go to Patient Education Activity  Goal: Patient/Family Education  Outcome: Progressing Towards Goal     Problem: Patient Education: Go to Patient Education Activity  Goal: Patient/Family Education  Outcome: Progressing Towards Goal     Problem: Knee Replacement: Day of Surgery/Unit  Goal: Off Pathway (Use only if patient is Off Pathway)  Outcome: Progressing Towards Goal  Goal: Activity/Safety  Outcome: Progressing Towards Goal  Goal: Consults, if ordered  Outcome: Progressing Towards Goal  Goal: Diagnostic Test/Procedures  Outcome: Progressing Towards Goal  Goal: Nutrition/Diet  Outcome: Progressing Towards Goal  Goal: Medications  Outcome: Progressing Towards Goal  Goal: Respiratory  Outcome: Progressing Towards Goal  Goal: Treatments/Interventions/Procedures  Outcome: Progressing Towards Goal  Goal: Psychosocial  Outcome: Progressing Towards Goal  Goal: *Initiate mobility  Outcome: Progressing Towards Goal  Goal: *Optimal pain control at patient's stated goal  Outcome: Progressing Towards Goal  Goal: *Hemodynamically stable  Outcome: Progressing Towards Goal

## 2021-12-30 NOTE — PROGRESS NOTES
Problem: Pain  Goal: *Control of Pain  12/30/2021 1045 by Tonja Reddy  Outcome: Resolved/Met  12/30/2021 0803 by Tonja Reddy  Outcome: Progressing Towards Goal  Goal: *PALLIATIVE CARE:  Alleviation of Pain  12/30/2021 1045 by Tonja Reddy  Outcome: Resolved/Met  12/30/2021 0803 by Tonja Reddy  Outcome: Progressing Towards Goal     Problem: Patient Education: Go to Patient Education Activity  Goal: Patient/Family Education  12/30/2021 1045 by Tonja Reddy  Outcome: Resolved/Met  12/30/2021 0803 by Tonja Reddy  Outcome: Progressing Towards Goal     Problem: Patient Education: Go to Patient Education Activity  Goal: Patient/Family Education  12/30/2021 1045 by Tonja Reddy  Outcome: Resolved/Met  12/30/2021 0803 by Tonja Reddy  Outcome: Progressing Towards Goal     Problem: Patient Education: Go to Patient Education Activity  Goal: Patient/Family Education  12/30/2021 1045 by Tonja Reddy  Outcome: Resolved/Met  12/30/2021 0803 by Tonja Reddy  Outcome: Progressing Towards Goal     Problem: Knee Replacement: Day of Surgery/Unit  Goal: Off Pathway (Use only if patient is Off Pathway)  12/30/2021 1045 by Tonja Reddy  Outcome: Resolved/Met  12/30/2021 0803 by Tonja Reddy  Outcome: Progressing Towards Goal  Goal: Activity/Safety  12/30/2021 1045 by Tonja Reddy  Outcome: Resolved/Met  12/30/2021 0803 by Tonja Reddy  Outcome: Progressing Towards Goal  Goal: Consults, if ordered  12/30/2021 1045 by Tonja Reddy  Outcome: Resolved/Met  12/30/2021 0803 by Tonja Reddy  Outcome: Progressing Towards Goal  Goal: Diagnostic Test/Procedures  12/30/2021 1045 by Tonja Reddy  Outcome: Resolved/Met  12/30/2021 0803 by Tonja Reddy  Outcome: Progressing Towards Goal  Goal: Nutrition/Diet  12/30/2021 1045 by Tonja Reddy  Outcome: Resolved/Met  12/30/2021 0803 by Tonja Reddy  Outcome: Progressing Towards Goal  Goal: Medications  12/30/2021 1045 by Tonja Reddy  Outcome: Resolved/Met  12/30/2021 0803 by Sg Peterson  Outcome: Progressing Towards Goal  Goal: Respiratory  12/30/2021 1045 by Sg Peterson  Outcome: Resolved/Met  12/30/2021 0803 by Sg Peterson  Outcome: Progressing Towards Goal  Goal: Treatments/Interventions/Procedures  12/30/2021 1045 by Sg Peterson  Outcome: Resolved/Met  12/30/2021 0803 by Sg Peterson  Outcome: Progressing Towards Goal  Goal: Psychosocial  12/30/2021 1045 by Sg Peterson  Outcome: Resolved/Met  12/30/2021 0803 by Sg Peterson  Outcome: Progressing Towards Goal  Goal: *Initiate mobility  12/30/2021 1045 by Sg Peterson  Outcome: Resolved/Met  12/30/2021 0803 by Sg Peterson  Outcome: Progressing Towards Goal  Goal: *Optimal pain control at patient's stated goal  12/30/2021 1045 by Sg Peterson  Outcome: Resolved/Met  12/30/2021 0803 by Sg Peterson  Outcome: Progressing Towards Goal  Goal: *Hemodynamically stable  12/30/2021 1045 by Sg Peterson  Outcome: Resolved/Met  12/30/2021 0803 by Sg Peterson  Outcome: Progressing Towards Goal     Problem: Falls - Risk of  Goal: *Absence of Falls  Description: Document Chelsie Ground Fall Risk and appropriate interventions in the flowsheet.   12/30/2021 1045 by Sg Peterson  Outcome: Resolved/Met  12/30/2021 0803 by Sg Peterson  Outcome: Progressing Towards Goal  Note: Fall Risk Interventions:  Mobility Interventions: Assess mobility with egress test,Bed/chair exit alarm,Communicate number of staff needed for ambulation/transfer,OT consult for ADLs,Patient to call before getting OOB,PT Consult for mobility concerns,PT Consult for assist device competence,Strengthening exercises (ROM-active/passive),Utilize walker, cane, or other assistive device         Medication Interventions: Assess postural VS orthostatic hypotension,Bed/chair exit alarm,Evaluate medications/consider consulting pharmacy,Patient to call before getting OOB                   Problem: Patient Education: Go to Patient Education Activity  Goal: Patient/Family Education  12/30/2021 1045 by Dafne Hooper  Outcome: Resolved/Met  12/30/2021 0803 by Dafne Hooper  Outcome: Progressing Towards Goal

## 2021-12-30 NOTE — HOME CARE
Received home health referral for [facility] for (SN, PT). Spoke with patient and spouse in room;  patient identifiers verified. Explained home care services and routines. Demographics verified including insurance, phone and address confirmed. Patient has the following DME: RW ordered. Caregivers available spouse as primary caregiver and other family. Orders noted and arranged to be processed to central intake.      ---   Alea Baer LPN  [facility] - INPATIENT Liaison

## 2021-12-30 NOTE — ROUTINE PROCESS
Received report from BOBBY MAYER. Pt AAOx3, NAD, breathing non labored, on room air, HOB up. IV site clean, dry and intact. Ace wrap left knee in place. Family member at the bedside. Bed at the lowest level on lock position, call bell w/i reach. Bedside and Verbal shift change report given to [facility] (oncoming nurse) by me (offgoing nurse). Report included the following information SBAR, Kardex, Procedure Summary, Intake/Output, MAR and Recent Results.

## 2021-12-30 NOTE — PROGRESS NOTES
Problem: Pain  Goal: *Control of Pain  12/30/2021 1045 by Cathryn Elfin Cove  Outcome: Resolved/Met  12/30/2021 0803 by Cathryn [place]fin [place]ove  Outcome: Progressing Towards Goal  Goal: *PALLIATIVE CARE:  Alleviation of Pain  12/30/2021 1045 by Cathryn [place]  Outcome: Resolved/Met  12/30/2021 0803 by Cathryn [place]  Outcome: Progressing Towards Goal     Problem: Pain  Goal: *PALLIATIVE CARE:  Alleviation of Pain  12/30/2021 1045 by Cathryn [place]ove  Outcome: Resolved/Met  12/30/2021 0803 by Cathryn [place]  Outcome: Progressing Towards Goal     Problem: Patient Education: Go to Patient Education Activity  Goal: Patient/Family Education  12/30/2021 1045 by Cathryn [place]ove  Outcome: Resolved/Met  12/30/2021 0803 by Cathryn [place]  Outcome: Progressing Towards Goal     Problem: Patient Education: Go to Patient Education Activity  Goal: Patient/Family Education  12/30/2021 1045 by Cathryn [place]  Outcome: Resolved/Met  12/30/2021 0803 by Cathryn [place]ove  Outcome: Progressing Towards Goal     Problem: Patient Education: Go to Patient Education Activity  Goal: Patient/Family Education  12/30/2021 1045 by Cathryn [place]  Outcome: Resolved/Met  12/30/2021 0803 by Cathryn [place]  Outcome: Progressing Towards Goal     Problem: Knee Replacement: Day of Surgery/Unit  Goal: Off Pathway (Use only if patient is Off Pathway)  12/30/2021 1045 by Cathryn [place]fin [place]ove  Outcome: Resolved/Met  12/30/2021 0803 by Cathryn [place]fin [place]ove  Outcome: Progressing Towards Goal  Goal: Activity/Safety  12/30/2021 1045 by Cathryn [place]  Outcome: Resolved/Met  12/30/2021 0803 by Cathryn [place]  Outcome: Progressing Towards Goal  Goal: Consults, if ordered  12/30/2021 1045 by Cathryn [place]  Outcome: Resolved/Met  12/30/2021 0803 by Cathryn [place]  Outcome: Progressing Towards Goal  Goal: Diagnostic Test/Procedures  12/30/2021 1045 by Cathryn [place]  Outcome: Resolved/Met  12/30/2021 0803 by Cathryn [place]fin [place]ove  Outcome: Progressing Towards Goal  Goal: Nutrition/Diet  12/30/2021 1045 by Contreras Alcaraz  Outcome: Resolved/Met  12/30/2021 0803 by Contreras Alcaraz  Outcome: Progressing Towards Goal  Goal: Medications  12/30/2021 1045 by Contreras Alcaraz  Outcome: Resolved/Met  12/30/2021 0803 by Contreras Alcaraz  Outcome: Progressing Towards Goal  Goal: Respiratory  12/30/2021 1045 by Contreras Alcaraz  Outcome: Resolved/Met  12/30/2021 0803 by Contreras Alcaraz  Outcome: Progressing Towards Goal  Goal: Treatments/Interventions/Procedures  12/30/2021 1045 by Contreras Alcaraz  Outcome: Resolved/Met  12/30/2021 0803 by Contreras Alcaraz  Outcome: Progressing Towards Goal  Goal: Psychosocial  12/30/2021 1045 by Contreras Alcaraz  Outcome: Resolved/Met  12/30/2021 0803 by Contreras Alcaraz  Outcome: Progressing Towards Goal  Goal: *Initiate mobility  12/30/2021 1045 by Contreras Alcaraz  Outcome: Resolved/Met  12/30/2021 0803 by Contreras Alcaraz  Outcome: Progressing Towards Goal  Goal: *Optimal pain control at patient's stated goal  12/30/2021 1045 by Contreras Alcaraz  Outcome: Resolved/Met  12/30/2021 0803 by Contreras Alcaraz  Outcome: Progressing Towards Goal  Goal: *Hemodynamically stable  12/30/2021 1045 by Contreras Alcaraz  Outcome: Resolved/Met  12/30/2021 0803 by Contreras Alcaraz  Outcome: Progressing Towards Goal     Problem: Falls - Risk of  Goal: *Absence of Falls  Description: Document Jennyfer Schuler Fall Risk and appropriate interventions in the flowsheet.   12/30/2021 1045 by Contreras Alcaraz  Outcome: Resolved/Met  12/30/2021 0803 by Contreras Alcaraz  Outcome: Progressing Towards Goal  Note: Fall Risk Interventions:  Mobility Interventions: Assess mobility with egress test,Bed/chair exit alarm,Communicate number of staff needed for ambulation/transfer,OT consult for ADLs,Patient to call before getting OOB,PT Consult for mobility concerns,PT Consult for assist device competence,Strengthening exercises (ROM-active/passive),Utilize walker, cane, or other assistive device         Medication Interventions: Assess postural VS orthostatic hypotension,Bed/chair exit alarm,Evaluate medications/consider consulting pharmacy,Patient to call before getting OOB                   Problem: Patient Education: Go to Patient Education Activity  Goal: Patient/Family Education  12/30/2021 1045 by Ariadne Bocanegra  Outcome: Resolved/Met  12/30/2021 0803 by Ariadne Bocanegra  Outcome: Progressing Towards Goal

## 2021-12-30 NOTE — DISCHARGE INSTRUCTIONS
DISCHARGE SUMMARY from Nurse    PATIENT INSTRUCTIONS:    After general anesthesia or intravenous sedation, for 24 hours or while taking prescription Narcotics:  · Limit your activities  · Do not drive and operate hazardous machinery  · Do not make important personal or business decisions  · Do  not drink alcoholic beverages  · If you have not urinated within 8 hours after discharge, please contact your surgeon on call. Report the following to your surgeon:  · Excessive pain, swelling, redness or odor of or around the surgical area  · Temperature over 100.5  · Nausea and vomiting lasting longer than 4 hours or if unable to take medications  · Any signs of decreased circulation or nerve impairment to extremity: change in color, persistent  numbness, tingling, coldness or increase pain  · Any questions    What to do at Home:  Recommended activity: Activity as tolerated, ***    If you experience any of the following symptoms chest pain, fever greater than 100.5, nausea, vomiting, pain unrelleved by medication, please follow up with Dr. Sheila Payton( 2 weeks). *  Please give a list of your current medications to your Primary Care Provider. *  Please update this list whenever your medications are discontinued, doses are      changed, or new medications (including over-the-counter products) are added. *  Please carry medication information at all times in case of emergency situations. These are general instructions for a healthy lifestyle:    No smoking/ No tobacco products/ Avoid exposure to second hand smoke  Surgeon General's Warning:  Quitting smoking now greatly reduces serious risk to your health.     Obesity, smoking, and sedentary lifestyle greatly increases your risk for illness    A healthy diet, regular physical exercise & weight monitoring are important for maintaining a healthy lifestyle    You may be retaining fluid if you have a history of heart failure or if you experience any of the following symptoms:  Weight gain of 3 pounds or more overnight or 5 pounds in a week, increased swelling in our hands or feet or shortness of breath while lying flat in bed. Please call your doctor as soon as you notice any of these symptoms; do not wait until your next office visit. Patient {ARMSHANTELLE:00775}    MyChart Activation    Thank you for requesting access to [employer] [facility]. Please follow the instructions below to securely access and download your online medical record. [employer] [facility] allows you to send messages to your doctor, view your test results, renew your prescriptions, schedule appointments, and more. How Do I Sign Up? 1. In your internet browser, go to [email or web address]  2. Click on the First Time User? Click Here link in the Sign In box. You will be redirect to the New Member Sign Up page. 3. Enter your [employer] Access Code exactly as it appears below. You will not need to use this code after youve completed the sign-up process. If you do not sign up before the expiration date, you must request a new code. [employer] [facility] Access Code: [email or web address]  Expires: 2022  8:49 AM (This is the date your [employer] access code will )    4. Enter the last four digits of your Social Security Number (xxxx) and Date of Birth (mm/dd/yyyy) as indicated and click Submit. You will be taken to the next sign-up page. 5. Create a [employer] [facility] ID. This will be your [employer] [facility] login ID and cannot be changed, so think of one that is secure and easy to remember. 6. Create a [employer] password. You can change your password at any time. 7. Enter your Password Reset Question and Answer. This can be used at a later time if you forget your password. 8. Enter your e-mail address. You will receive e-mail notification when new information is available in [address] 9. Click Sign Up. You can now view and download portions of your medical record.   10. Click the Download Summary menu link to download a portable copy of your medical information. Additional Information    If you have questions, please visit the Frequently Asked Questions section of the [employer] website at [email or web address] Remember, [employer] is NOT to be used for urgent needs. For medical emergencies, dial 911. The discharge information has been reviewed with the {PATIENT PARENT GUARDIAN:88539}. The {PATIENT PARENT GUARDIAN:10271} verbalized understanding. Discharge medications reviewed with the {[facility]} and appropriate educational materials and side effects teaching were provided.   ___________________________________________________________________________________________________________________________________

## 2021-12-31 ENCOUNTER — HOME CARE VISIT (OUTPATIENT)
Dept: HOME HEALTH SERVICES | Facility: HOME HEALTH | Age: 51
End: 2021-12-31
Payer: MEDICARE

## 2021-12-31 ENCOUNTER — HOME CARE VISIT (OUTPATIENT)
Dept: SCHEDULING | Facility: HOME HEALTH | Age: 51
End: 2021-12-31
Payer: MEDICARE

## 2021-12-31 VITALS
HEART RATE: 78 BPM | SYSTOLIC BLOOD PRESSURE: 120 MMHG | RESPIRATION RATE: 17 BRPM | TEMPERATURE: 97.6 F | OXYGEN SATURATION: 97 % | DIASTOLIC BLOOD PRESSURE: 80 MMHG

## 2021-12-31 PROCEDURE — 400013 HH SOC

## 2021-12-31 PROCEDURE — 3331090001 HH PPS REVENUE CREDIT

## 2021-12-31 PROCEDURE — 3331090002 HH PPS REVENUE DEBIT

## 2021-12-31 PROCEDURE — G0151 HHCP-SERV OF PT,EA 15 MIN: HCPCS

## 2021-12-31 PROCEDURE — 400018 HH-NO PAY CLAIM PROCEDURE

## 2021-12-31 NOTE — Clinical Note
patient will be seen 1w1 3w2 for PT to improve overall functional mobility by graded therapeutic exercises, therapeutic activities, gait training, balance training and patient/caregiver education for safety at home.   Thank you for your referral

## 2021-12-31 NOTE — HOME HEALTH
Skilled services/Home bound verification:     Skilled Reason for admission/summary of clinical condition:  L TKR . This patient is homebound for the following reasons Requires considerable and taxing effort to leave the home . Caregiver: spouse. Caregiver assists with IADL's. Medications reconciled and all medications are available in the home this visit. The following education was provided regarding medications, medication interactions, and look alike medications (specify): Barnetta Abreu Medications  are effective at this time. High risk medication teaching regarding anticoagulants, hyperglycemic agents or opiod narcotics performed (specify) roxicodone for risk of overdose ,    Dr. Jane Garcia notified of any discrepancies/medication interactions dc to family with MD supervision when all goals are met . Home health supplies by type and quantity ordered/delivered this visit include: opsite dressing     Patient education provided this visit to include: HEP, fall prevention, dcc planning . Patient/caregiver degree of understanding:good    Patient level of understanding of education provided: needed verbal cues to complete HEP       Patient response to procedure performed:  Patient without adverse reaction to HEP,needed sufficient rest period due to complaint of pain on L knee     Home exercise program/Homework provided: patient was educated with HEP including supine ankle oumps, ankle circles, quad sets, hamstring sets, heel slides and gluteal sets x 20 reps x 3 sets daily to improve ROM and MMT on L knee to improve gait and transfers followed by ice x 20 minutes at a time on L knee to decrease pain and swelling. Patient educated with fall prevention technique by decluttering space, proper use of AD and footwear    Pt/Caregiver instructed on plan of care and are agreeable to plan of care at this time.       Physician Dr. Jane Garcia  notified of patient admission to home health and plan of care including anticipated frequency of 1w1 3w2 for PT Discharge planning discussed with patient and caregiver. Discharge planning as follows: dc to family with MD supervision when all goals are met . Pt/Caregiver did verbalize understanding of discharge planning. Next MD appointment 2 weeks . Patient/caregiver encouraged/instructed to keep appointment as lack of follow through with physician appointment could result in discontinuation of home care services for non-compliance. PMHx: HTN         Hypercholesteremia         Multiple Sclerosis  S: pain on L knee 6/10 that is managed by pain medication as needed and ice x 20 minutes at a time   O:Patients Goals= Be able to go back to PLOF  Wound/Incision: location, description, drainage: intact aquacel dressing on L knee As of 1/2/22, SN changed dressing on L knee  PLOF: Lives in a 2 level house, has >10 steps to go to 2nd floor bedroom, prior to surgery, she was using SPC occasionally due to pain on L knee and was having difficulty with performing ADL's and IADL's with difficulty  STRENGTH L knee flexion 2+/5, L knee extension 3-/5, RLE wfl  ROM L knee 12-46   BALANCE  Tinetti 11/28 high fall risk due to reduced strength on LLE, gait deviation and decreased efficiency of balance reactions. Patient demonstrated poor use of hip and ankle strategy during standing balance activity. Patient requires support from AD at all times for safety and stability. GAIT Patient was able to ambulate with RW x 30 feet with supervision with noted antalgic gait on LLE with decreased step length and stride length on LLE with wide CAMILO and slow paced.  patient was educated with heel to toe gait pattern to prevent falls   BED MOBILITY patient needed Min A x1 with bed mobility   TRANSFERS Patient needed Min A x1 with verbal cues using RW to and from bed, chair and toilet   A: PT evaluation completed with the presence of  spouse who is the primary CG, POC established, Med rec done, HEP established  P:Home Safety eval/recommendations: Home health physical therapy initial evaluation performed. Patient demonstrates decreased strength, balance, and endurance which increases patient's overall fall risk and burden of care. Patient would benefit from home health physical therapy to improve balance, strength, and endurance which would decrease fall risk and allow patient to return to prior level of function once all functional goals or full rehab potential is met. patient was educated with HEP including supine ankle oumps, ankle circles, quad sets, hamstring sets, heel slides and gluteal sets x 20 reps x 3 sets daily to improve ROM and MMT on L knee to improve gait and transfers followed by ice x 20 minutes at a time on L knee to decrease pain and swelling.  Patient educated with fall prevention technique by decluttering space, proper use of AD and footwear

## 2022-01-01 ENCOUNTER — HOME CARE VISIT (OUTPATIENT)
Dept: SCHEDULING | Facility: HOME HEALTH | Age: 52
End: 2022-01-01
Payer: MEDICARE

## 2022-01-01 PROCEDURE — 3331090001 HH PPS REVENUE CREDIT

## 2022-01-01 PROCEDURE — G0299 HHS/HOSPICE OF RN EA 15 MIN: HCPCS

## 2022-01-01 PROCEDURE — 3331090002 HH PPS REVENUE DEBIT

## 2022-01-02 ENCOUNTER — HOME CARE VISIT (OUTPATIENT)
Dept: SCHEDULING | Facility: HOME HEALTH | Age: 52
End: 2022-01-02
Payer: MEDICARE

## 2022-01-02 PROCEDURE — 3331090002 HH PPS REVENUE DEBIT

## 2022-01-02 PROCEDURE — G0299 HHS/HOSPICE OF RN EA 15 MIN: HCPCS

## 2022-01-02 PROCEDURE — 3331090001 HH PPS REVENUE CREDIT

## 2022-01-02 NOTE — HOME HEALTH
Skilled Reason for admission/summary of clinical condition:  46-year old female who recently had Left TKA surgery-doing pretty well, pain is manageable, dressing with minimal drainage present. Patient states that she is ready and willing to do all that it takes to get back to ambulating and caring for herself again and working. Skilled nursing in for disease/medication management and education regarding recent left TKA surgery-pain control, incision assessment, dressing changes per orders, monitoring for possible post op complications (pneumonia, infection, DVT). Patient states that ace wrap feels too tight, removed at this time and patient felt immediate relief. This patient is homebound for the following reasons Requires the assistance of 1 or more persons to leave the home  and Only leaves the home for medical reasons or Denominational services and are infrequent and of short duration for other reasons. Caregiver involvement:  involved in care, able to assist with ADL's, medications, meal preparation, transportation to MD appointments. Medications reviewed and all medications are available in the home this visit. The following education was provided regarding medications, medication interactions, and look alike medications (specify): side effects, dosages, purposes, frequencies. High risk medication education-Roxicodone-pain medication-take only as prescribed, monitor for signs of sedation/dizziness/constipation that could occur with use, notify MD/agency if ineffective for pain control. Medications are somewhat effective at this time. Home health supplies by type and quantity ordered/delivered this visit include: Post op dressings. Patient education provided this visit:   INSTRUCTED PATIENT AND CG THAT SHOULD ANY NEEDS OR CONCERNS ARISE TO FIRST CALL OUR OFFICE, OR THE DR'S OFFICE  OR GO TO AN URGENT CARE CENTER AND NOT TO THE ED FOR NON-LIFE THREATENING EVENTS.  IF IT IS LIFE THREATENING THEN CALL 911 OR GO TO THE CLOSEST ER. Patient is a fall risk. Educated pateint to sit on the side of the chair/bed, take a slow deep breaths, have feet firmly planted before standing up, use cane/walker if available, or have someone to assist.  reviewed cardiac diet- monitoring sodium intake, cholesterol and fat intake. patient aware to limit sodium, no added sodium to diet. reviewed foods to avoid, how to order foods when eating out, how to read nutrition labels and measure sodium, cholesterol and fat intake. patient instructed to maintain clear pathways in home and to minimize clutter to prevent falls from occurring/minimize fall potential.  pt aware to keep dressing clean, dry and intact as ordered. patient made aware to monitor for s/s of infection [increased swelling, increased redness around site, increased pain, foul smelling drainage, fever] aware who to report to/when. Patient level of understanding of education provided: Good  Skilled Care Performed this visit: Initial visit-education, dressing assessment  Patient response to procedure performed:  Not applicable this visit. Agency Progress toward goals: Initial visit-goals initiated  Patient's Progress towards personal goals: Initial visit-goals initiated. Home exercise program: Take medications as prescribed, monitor for signs of infection, monitor for signs of pneumonia or DVT, keep dressing clean and dry, prevention/treatment of constipation that may occur, encouraged to keep all MD appointments. Continued need for the following skills: Nursing, Physical Therapy, Occupational Therapy  Plan for next visit: Education and review, incision assessment and dressing change.   Patient and/or caregiver notified and agrees to changes in the Plan of Care YES    The following discharge planning was discussed with the pt/caregiver: Patient will be discharged once education has completed, patient is medically stable and pt/cg are able to independently manage dressing changes/ incisional care. Prior to the visit, the patient was screened for the followin.) International travel in the last month:  NO  2.) Exposure Screening:  Contact with anyone with the following symptoms in the last 14 days:    a.) Fever, or lower respiratory illness (shortness of breath or cough) or NO  b.) Fever with severe acute respiratory illness (pneumonia or acute respiratory distress syndrome) NO   3.) The patient has the following symptoms:  a.) Fever, cough, shortness of breath NO  If the patient has the above symptoms: The home health attending or hospice medical director will be contacted and scheduled patient visits will be placed on hold until the patient is confirmed to be positive or negative. An order will be obtained to hold visits until test results are confirmed. If negative, patient visits will resume. If positive, a delay in care order will be obtained as well as orders to restart care after the 14 day isolation period. During the 14 day isolation period the patient will be contacted by their primary  2 times a week to check on patient status and to make sure they have no questions about medications, disease process, and that they have had no changes in condition. The patient will be instructed to contact home health or hospice during the delay of care for any changes in conditions, questions, or concerns.

## 2022-01-03 PROCEDURE — 3331090001 HH PPS REVENUE CREDIT

## 2022-01-03 PROCEDURE — 3331090002 HH PPS REVENUE DEBIT

## 2022-01-04 ENCOUNTER — HOME CARE VISIT (OUTPATIENT)
Dept: SCHEDULING | Facility: HOME HEALTH | Age: 52
End: 2022-01-04
Payer: MEDICARE

## 2022-01-04 ENCOUNTER — HOME CARE VISIT (OUTPATIENT)
Dept: HOME HEALTH SERVICES | Facility: HOME HEALTH | Age: 52
End: 2022-01-04
Payer: MEDICARE

## 2022-01-04 VITALS
SYSTOLIC BLOOD PRESSURE: 138 MMHG | HEART RATE: 90 BPM | TEMPERATURE: 97.9 F | DIASTOLIC BLOOD PRESSURE: 78 MMHG | RESPIRATION RATE: 20 BRPM | OXYGEN SATURATION: 98 %

## 2022-01-04 VITALS
OXYGEN SATURATION: 98 % | HEART RATE: 82 BPM | DIASTOLIC BLOOD PRESSURE: 80 MMHG | TEMPERATURE: 97.8 F | RESPIRATION RATE: 20 BRPM | SYSTOLIC BLOOD PRESSURE: 130 MMHG

## 2022-01-04 VITALS
OXYGEN SATURATION: 100 % | DIASTOLIC BLOOD PRESSURE: 90 MMHG | HEART RATE: 74 BPM | TEMPERATURE: 97.2 F | RESPIRATION RATE: 16 BRPM | SYSTOLIC BLOOD PRESSURE: 145 MMHG

## 2022-01-04 PROCEDURE — 3331090001 HH PPS REVENUE CREDIT

## 2022-01-04 PROCEDURE — G0157 HHC PT ASSISTANT EA 15: HCPCS

## 2022-01-04 PROCEDURE — 3331090002 HH PPS REVENUE DEBIT

## 2022-01-04 NOTE — CASE COMMUNICATION
Chris Montelongo,     I just wanted to make you aware that Mrs. Simpson has been admitted to home health care services with skilled nursing in for disease/medication management and education regarding recent left TKA surgery-pain control, incision assessment, dressing changes per orders, monitoring for possible post op complications (pneumonia, infection, DVT). Thank you for allowing [address] to care for your patient.     PEBBLES fowler,  Shelley Clark RN

## 2022-01-04 NOTE — DISCHARGE SUMMARY
[address] [place] [address]    Name:  Jordin Dai  MR#:   [id number]  :  1970  ACCOUNT #:  [de-identified]  ADMIT DATE:  2021  DISCHARGE DATE:  2021    HOSPITAL COURSE:  The patient is a 57-year-old lady who was admitted with end-stage osteoarthritis of the knee. She underwent a total knee replacement using the DePuy Attune posterior-stabilized cemented rotating platform system. Her postoperative course was benign and at the time of discharge, she was ambulating and weightbearing as tolerated with a walker. She was on a regular diet. She was taking aspirin for venous thromboembolism prophylaxis and oxycodone for pain. She should be followed by home health for home physical therapy and home nursing.   She is to return to see me in 2 weeks for suture removal.        Gorge Phan MD      [id number]  D:  2022 9:43  T:  2022 2:27  JOB #:  [id number]

## 2022-01-04 NOTE — CASE COMMUNICATION
Chris Saenz,     I wanted to let you know that I saw the patient again today due to blistering around bandage. I removed bandage and blisters noted along the edge of where the dressing was, none near incision. Incision looks good, no signs of infection noted. Patient aware to notify your office and/or our agency if any changes/worsening symptoms occur.       Thank you,  Shila Wallace RN

## 2022-01-04 NOTE — HOME HEALTH
Skilled Reason for admission/summary of clinical condition:  46-year old female who recently had Left TKA surgery-doing pretty well, pain is manageable. Called to see patient due to blistering around post op dressing-picture taken into chart. Dressing removed, blistering noted around edges of where dressing was (Aquacel with duoderm post op dressing). Honeycomb dressing used and blisters covered with transparent dressing. Patient made aware to monitor for signs of infection, and to notify if areas worsen. Patient states that she is ready and willing to do all that it takes to get back to ambulating and caring for herself again and working. Skilled nursing in for disease/medication management and education regarding recent left TKA surgery-pain control, incision assessment, dressing changes per orders, monitoring for possible post op complications (pneumonia, infection, DVT). Patient states that ace wrap feels too tight, removed at this time and patient felt immediate relief. This patient is homebound for the following reasons Requires the assistance of 1 or more persons to leave the home  and Only leaves the home for medical reasons or [employer] services and are infrequent and of short duration for other reasons. Caregiver involvement:  involved in care, able to assist with ADL's, medications, meal preparation, transportation to MD appointments. Medications reviewed and all medications are available in the home this visit. The following education was provided regarding medications, medication interactions, and look alike medications (specify): side effects, dosages, purposes, frequencies. High risk medication education-Roxicodone-pain medication-take only as prescribed, monitor for signs of sedation/dizziness/constipation that could occur with use, notify MD/agency if ineffective for pain control. Medications are somewhat effective at this time.     Home health supplies by type and quantity ordered/delivered this visit include: Post op dressings. Patient education provided this visit:   INSTRUCTED PATIENT AND CG THAT SHOULD ANY NEEDS OR CONCERNS ARISE TO FIRST CALL OUR OFFICE, OR THE DR'S OFFICE  OR GO TO AN URGENT CARE CENTER AND NOT TO THE ED FOR NON-LIFE THREATENING EVENTS. IF IT IS LIFE THREATENING THEN CALL 911 OR GO TO THE CLOSEST ER. Patient is a fall risk. Educated pateint to sit on the side of the chair/bed, take a slow deep breaths, have feet firmly planted before standing up, use cane/walker if available, or have someone to assist.  reviewed cardiac diet- monitoring sodium intake, cholesterol and fat intake. patient aware to limit sodium, no added sodium to diet. reviewed foods to avoid, how to order foods when eating out, how to read nutrition labels and measure sodium, cholesterol and fat intake. patient instructed to maintain clear pathways in home and to minimize clutter to prevent falls from occurring/minimize fall potential.  pt aware to keep dressing clean, dry and intact as ordered. patient made aware to monitor for s/s of infection [increased swelling, increased redness around site, increased pain, foul smelling drainage, fever] aware who to report to/when. Patient level of understanding of education provided: Good  Skilled Care Performed this visit: Initial visit-education, dressing assessment  Patient response to procedure performed:  Not applicable this visit. Agency Progress toward goals: Initial visit-goals initiated  Patient's Progress towards personal goals: Initial visit-goals initiated. Home exercise program: Take medications as prescribed, monitor for signs of infection, monitor for signs of pneumonia or DVT, keep dressing clean and dry, prevention/treatment of constipation that may occur, encouraged to keep all MD appointments.   Continued need for the following skills: Nursing, Physical Therapy, Occupational Therapy  Plan for next visit: Education and review, incision assessment and dressing change. Patient and/or caregiver notified and agrees to changes in the Plan of Care YES    The following discharge planning was discussed with the pt/caregiver: Patient will be discharged once education has completed, patient is medically stable and pt/cg are able to independently manage dressing changes/ incisional care. Prior to the visit, the patient was screened for the followin.) International travel in the last month:  NO  2.) Exposure Screening:  Contact with anyone with the following symptoms in the last 14 days:    a.) Fever, or lower respiratory illness (shortness of breath or cough) or NO  b.) Fever with severe acute respiratory illness (pneumonia or acute respiratory distress syndrome) NO   3.) The patient has the following symptoms:  a.) Fever, cough, shortness of breath NO  If the patient has the above symptoms: The home health attending or hospice medical director will be contacted and scheduled patient visits will be placed on hold until the patient is confirmed to be positive or negative. An order will be obtained to hold visits until test results are confirmed. If negative, patient visits will resume. If positive, a delay in care order will be obtained as well as orders to restart care after the 14 day isolation period. During the 14 day isolation period the patient will be contacted by their primary  2 times a week to check on patient status and to make sure they have no questions about medications, disease process, and that they have had no changes in condition. The patient will be instructed to contact home health or hospice during the delay of care for any changes in conditions, questions, or concerns.

## 2022-01-04 NOTE — Clinical Note
I need dosage/ M, 100, 200? Thank you    ----- Message -----  From: Carroll Maza PTA  Sent: 2022  10:22 PM EST  To: Naren Chacon PT      Pt has new med.   Tramadol:   Take one tab by mouth every 8 hours as needed   60 tablets,   prescribed 1/3/22, exp 1/3/23  Noah BORJAS  Please add to list.     Thank you

## 2022-01-05 ENCOUNTER — HOME CARE VISIT (OUTPATIENT)
Dept: SCHEDULING | Facility: HOME HEALTH | Age: 52
End: 2022-01-05
Payer: MEDICARE

## 2022-01-05 VITALS
DIASTOLIC BLOOD PRESSURE: 90 MMHG | HEART RATE: 90 BPM | OXYGEN SATURATION: 99 % | SYSTOLIC BLOOD PRESSURE: 160 MMHG | RESPIRATION RATE: 16 BRPM

## 2022-01-05 PROCEDURE — G0157 HHC PT ASSISTANT EA 15: HCPCS

## 2022-01-05 PROCEDURE — G0300 HHS/HOSPICE OF LPN EA 15 MIN: HCPCS

## 2022-01-05 PROCEDURE — 3331090002 HH PPS REVENUE DEBIT

## 2022-01-05 PROCEDURE — 3331090001 HH PPS REVENUE CREDIT

## 2022-01-05 NOTE — HOME HEALTH
SUBJECTIVE: The patient reports decreasing her activity after her PT visit yesterday. She used cold therapy 20 min on/off. Finoa Heard CAREGIVER INVOLVEMENT/ASSISTANCE NEEDED FOR: transportation, meals  . HOME HEALTH SUPPLIES BY TYPE AND QUANTITY ORDERED/DELIVERED THIS VISIT INCLUDE: none  . OBJECTIVE:  See interventions. PATIENT RESPONSE TO TREATMENT:  Pt with good tolerance with her session of transfers, stairs and stretches. PATIENT LEVEL OF UNDERSTANDING OF EDUCATION PROVIDED: Pt able to safely demonstrate her transfers all throughout the home. She could return demonstrate her new stretches today. ASSESSMENT OF PROGRESS TOWARD GOALS:   L knee ROM: 10-88 degrees    MOD I - Ind for all indoor transfers. Will assess vehicle transfers in future visit. Sx decreasing overall since she stopped cleaning the house. Good stability with amb, but with slight limp. Home Health PT is medically necessary to address the following:  pain, decreased ROM, decreased strength, impaired gait, impaired stair negotiation and impaired stability to decrease sx, improve functional Ind, quality of life, reduce the fall risk.      PLAN FOR NEXT VISIT:  add standing exercises, car transfer weather permitting    [address] PATIENT/CAREGIVER: Estimated PT DC 1/14

## 2022-01-05 NOTE — HOME HEALTH
SUBJECTIVE:The patient reports being up and moving a lot today. She reports cleaning the bed and the bathroom, made her bed and was about to vacuum. The patient reports doing her HEP all day. She reported that she already did them about 4 times today. CAREGIVER INVOLVEMENT/ASSISTANCE NEEDED FOR: transportation  . HOME HEALTH SUPPLIES BY TYPE AND QUANTITY ORDERED/DELIVERED THIS VISIT INCLUDE: none  . OBJECTIVE:  See interventions. PATIENT RESPONSE TO TREATMENT:    Pt with slight increase in sx with her exercises today. She performed minimal reps to demonstrate understanding due to having performed them many times already today. PATIENT LEVEL OF UNDERSTANDING OF EDUCATION PROVIDED:   Pt able to return demonstrate her exercises with fair understanding. She amb with antalgic gait without her walker. With walker, she was able to correct her gait pattern. Instructed to cont with her walker at this time. She reported that she will. She is also exercising too much as well as performing chores including vacuuming. Educated on how vacuuming and sweeping can week the knee. Instructed to hold the chores in general as she is jumping in to too much activity too soon. She reports she will stop and focus on her HEP and cold packs. ASSESSMENT OF PROGRESS TOWARD GOALS:   Bandage intact. No drainage observed. Mod warmth lat/med to the L knee. Demonstrated Ind with all bed transfer and mobility. Bed mobility gaol achieved. Home Health PT is medically necessary to address the following:  pain, decreased ROM, decreased strength,  decreased Ind with functional transfers, impaired gait, impaired stair negotiation and impaired stability to decrease sx, improve functional Ind, quality of life, reduce the fall risk.      PLAN FOR NEXT VISIT:  assess remaining transfers in the home, cont with gait training and add quad stretch with belt and seated quad stretch    THE FOLLOWING DISCHARGE PLANNING WAS DISCUSSED WITH THE PATIENT/CAREGIVER: Estimated DC from PT 1/14

## 2022-01-05 NOTE — CASE COMMUNICATION
Pt has new med.   Tramadol:   Take one tab by mouth every 8 hours as needed   60 tablets,   prescribed 1/3/22, exp 1/3/23  Keshia BORJAS  Please add to list.     Thank you

## 2022-01-06 PROCEDURE — 3331090002 HH PPS REVENUE DEBIT

## 2022-01-06 PROCEDURE — 3331090001 HH PPS REVENUE CREDIT

## 2022-01-07 ENCOUNTER — HOME CARE VISIT (OUTPATIENT)
Dept: SCHEDULING | Facility: HOME HEALTH | Age: 52
End: 2022-01-07
Payer: MEDICARE

## 2022-01-07 VITALS
HEART RATE: 81 BPM | OXYGEN SATURATION: 97 % | TEMPERATURE: 97.3 F | SYSTOLIC BLOOD PRESSURE: 140 MMHG | DIASTOLIC BLOOD PRESSURE: 90 MMHG | RESPIRATION RATE: 16 BRPM

## 2022-01-07 PROCEDURE — 3331090002 HH PPS REVENUE DEBIT

## 2022-01-07 PROCEDURE — G0157 HHC PT ASSISTANT EA 15: HCPCS

## 2022-01-07 PROCEDURE — 3331090001 HH PPS REVENUE CREDIT

## 2022-01-08 PROCEDURE — 3331090002 HH PPS REVENUE DEBIT

## 2022-01-08 PROCEDURE — 3331090001 HH PPS REVENUE CREDIT

## 2022-01-08 NOTE — HOME HEALTH
SUBJECTIVE: I have been working the leg like you cannot believe. I can bring the leg all the way up with the belt. Ann Adams CAREGIVER INVOLVEMENT/ASSISTANCE NEEDED FOR: The patient's  will take her to her upcoming MD appointment. He assists with meals. Ann Adams HOME HEALTH SUPPLIES BY TYPE AND QUANTITY ORDERED/DELIVERED THIS VISIT INCLUDE: none  . OBJECTIVE:  See interventions. PATIENT RESPONSE TO TREATMENT:    Fair tolerance with exercises today as well as amb with cane. She did not require taking her pain meds prior to or after her exercises, per her report. PATIENT LEVEL OF UNDERSTANDING OF EDUCATION PROVIDED:  The patient was able to demonstrate understanding of her new standing exercises today. She demonstrated understanding of amb correctly post education with her SPC. ASSESSMENT OF PROGRESS TOWARD GOALS:   L knee ROM: 10 - 90  Fair tolerance to new exercises with Fair return demonstration. Home Health PT is medically necessary to address the following:  pain, decreased ROM, decreased strength,  impaired gait, impaired stair negotiation and impaired stability to decrease sx, improve functional Ind, quality of life, reduce the fall risk.      PLAN FOR NEXT VISIT:  Car transfers, Tinetti Balance Assessment    THE FOLLOWING DISCHARGE PLANNING WAS DISCUSSED WITH THE PATIENT/CAREGIVER: ~ DC date 1/14

## 2022-01-09 PROCEDURE — 3331090002 HH PPS REVENUE DEBIT

## 2022-01-09 PROCEDURE — 3331090001 HH PPS REVENUE CREDIT

## 2022-01-09 NOTE — HOME HEALTH
Skilled reason for visit: Left TKA    Caregiver involvement: spouse in home and assist as needed    Medications reviewed and all medications are available in the home this visit. The following education was provided regarding medications, medication interactions, and look a like medications: reviewed side effects, purposes, dosage, frequencies. Medications  are effective at this time. Home health supplies by type and quantity ordered/delivered this visit include: dressings ordered on admission. Patient education provided this visit: patient/cg instructed to monitor for edema/increase in edema, to elevate extremity when edema occurs and to notify md if edema exceeds normal limits for patient. pt aware to keep dressing clean, dry and intact as ordered. dressing changed today- incision healing well with no s/s of infection. patient made aware to monitor for s/s of infection [increased swelling, increased redness around site, increased pain, foul smelling drainage, fever] aware who to report to/when. pt instructed to follow a high protein diet for healing- to try to get 90g protein daily. Patient level of understanding of education provided: pt verbalizes understanding    Skilled Care Performed this visit: education, acess surgical incision    Patient response to procedure performed:  pt able to verbalize no pain or distress noted at this time    Progress toward goals: goals/teaching reviewed. patient is progressing towards goals at this time, skilled need to continue monitoring incision and for dressing changes. Home exercise program: as tolerated     Continued need for the following skills: Nursing and Physical Therapy    The following discharge planning was discussed with the pt/caregiver: Patient will be discharged once education has completed, patient is medically stable and pt/cg are able to independently manage dressing changes/ incisional care.

## 2022-01-10 ENCOUNTER — HOME CARE VISIT (OUTPATIENT)
Dept: SCHEDULING | Facility: HOME HEALTH | Age: 52
End: 2022-01-10
Payer: MEDICARE

## 2022-01-10 VITALS — TEMPERATURE: 89 F | DIASTOLIC BLOOD PRESSURE: 80 MMHG | SYSTOLIC BLOOD PRESSURE: 120 MMHG | OXYGEN SATURATION: 98 %

## 2022-01-10 PROCEDURE — G0157 HHC PT ASSISTANT EA 15: HCPCS

## 2022-01-10 PROCEDURE — 3331090002 HH PPS REVENUE DEBIT

## 2022-01-10 PROCEDURE — 3331090001 HH PPS REVENUE CREDIT

## 2022-01-10 PROCEDURE — G0299 HHS/HOSPICE OF RN EA 15 MIN: HCPCS

## 2022-01-10 NOTE — HOME HEALTH
SUBJECTIVE: The patient reports that she is doing well. She has no pain, just soreness and tightness. Gin Cordon CAREGIVER INVOLVEMENT/ASSISTANCE NEEDED FOR:  Family is assisting with transportation, meals  . HOME HEALTH SUPPLIES BY TYPE AND QUANTITY ORDERED/DELIVERED THIS VISIT INCLUDE: none  . OBJECTIVE:  See interventions. Tinetti Balance Assessment: 25/28 low fall risk. PATIENT RESPONSE TO TREATMENT:    Good tolerance to all activities including community amb this session. Pt denies high pain levels. She reports a slight increase in sx with community amb that resolved once she stopped amb. PATIENT LEVEL OF UNDERSTANDING OF EDUCATION PROVIDED: Good, no concerns at this time. ASSESSMENT OF PROGRESS TOWARD GOALS:   Bed mobility with Ind. No falls to date and pt denies any instability issues. Sx decreasing over this episode to pt tolerance. All transfers safe and MOD I - Ind. Transfer goal achieved. Stability improved from high fall risk to low fall risk per Tinetti balance assessment. CONTINUED NEED FOR THE FOLLOWING SKILLS: Pt requires continued interventions to increase her ROM L knee and strength L knee and prepare for DC.      PLAN FOR NEXT VISIT:  step exercises    THE FOLLOWING DISCHARGE PLANNING WAS DISCUSSED WITH THE PATIENT/CAREGIVER:  Estimated DC 1/14

## 2022-01-11 PROCEDURE — 3331090001 HH PPS REVENUE CREDIT

## 2022-01-11 PROCEDURE — 3331090002 HH PPS REVENUE DEBIT

## 2022-01-12 ENCOUNTER — HOME CARE VISIT (OUTPATIENT)
Dept: SCHEDULING | Facility: HOME HEALTH | Age: 52
End: 2022-01-12
Payer: MEDICARE

## 2022-01-12 VITALS
HEART RATE: 91 BPM | OXYGEN SATURATION: 97 % | TEMPERATURE: 97.2 F | DIASTOLIC BLOOD PRESSURE: 80 MMHG | SYSTOLIC BLOOD PRESSURE: 110 MMHG

## 2022-01-12 PROCEDURE — 3331090001 HH PPS REVENUE CREDIT

## 2022-01-12 PROCEDURE — 3331090002 HH PPS REVENUE DEBIT

## 2022-01-12 PROCEDURE — G0157 HHC PT ASSISTANT EA 15: HCPCS

## 2022-01-12 NOTE — HOME HEALTH
SUBJECTIVE: The doctor said I was 6 weeks ahead of schedule. He took me off the meds, but said cont with the aspirin for 2 more weeks. He released me to go back to work and drive. I am going back to work Monday. Right now, it hurts so bad from when they took the renan out. [place]  CAREGIVER INVOLVEMENT/ASSISTANCE NEEDED FOR:  Some meals, chores  . HOME HEALTH SUPPLIES BY TYPE AND QUANTITY ORDERED/DELIVERED THIS VISIT INCLUDE: none  . OBJECTIVE:  See interventions. PATIENT RESPONSE TO TREATMENT:    The patient was able to tolerate her upgraded exercises with no increase in sx. PATIENT LEVEL OF UNDERSTANDING OF EDUCATION PROVIDED:   Pt with good return demonstration of her newly added exercises. ASSESSMENT OF PROGRESS TOWARD GOALS:   L knee ROM:   10- 95, progressing towards goal of 0 -100   Bed Mobility with Ind to meet bed mobility goal.  All transfers with MOD I - Ind to achieve her transfer goals. The patient is able to amb in her community and to/from MD [facility]man. She has achieved hr gait goals. CONTINUED NEED FOR THE FOLLOWING SKILLS:  OP PT for further strengthening post DC from [facility]. She plans to go to [facility]. PLAN FOR NEXT VISIT: DC    THE FOLLOWING DISCHARGE PLANNING WAS DISCUSSED WITH THE PATIENT/CAREGIVER: Pt to be DC next session.

## 2022-01-13 PROCEDURE — 3331090002 HH PPS REVENUE DEBIT

## 2022-01-13 PROCEDURE — 3331090001 HH PPS REVENUE CREDIT

## 2022-01-14 ENCOUNTER — HOME CARE VISIT (OUTPATIENT)
Dept: SCHEDULING | Facility: HOME HEALTH | Age: 52
End: 2022-01-14
Payer: MEDICARE

## 2022-01-14 PROCEDURE — G0151 HHCP-SERV OF PT,EA 15 MIN: HCPCS

## 2022-01-14 PROCEDURE — 3331090001 HH PPS REVENUE CREDIT

## 2022-01-14 PROCEDURE — 3331090002 HH PPS REVENUE DEBIT

## 2022-01-15 VITALS
OXYGEN SATURATION: 98 % | HEART RATE: 79 BPM | DIASTOLIC BLOOD PRESSURE: 70 MMHG | TEMPERATURE: 98.5 F | SYSTOLIC BLOOD PRESSURE: 120 MMHG

## 2022-01-15 PROCEDURE — 3331090002 HH PPS REVENUE DEBIT

## 2022-01-15 PROCEDURE — 3331090001 HH PPS REVENUE CREDIT

## 2022-01-16 PROCEDURE — 3331090001 HH PPS REVENUE CREDIT

## 2022-01-16 PROCEDURE — 3331090002 HH PPS REVENUE DEBIT

## 2022-03-18 PROBLEM — G89.4 CHRONIC PAIN SYNDROME: Status: ACTIVE | Noted: 2017-04-14

## 2022-03-18 PROBLEM — M54.42 ACUTE MIDLINE LOW BACK PAIN WITH LEFT-SIDED SCIATICA: Status: ACTIVE | Noted: 2018-11-02

## 2022-03-18 PROBLEM — M51.26 HNP (HERNIATED NUCLEUS PULPOSUS), LUMBAR: Status: ACTIVE | Noted: 2018-11-02

## 2022-03-18 PROBLEM — Z79.899 ENCOUNTER FOR LONG-TERM (CURRENT) USE OF HIGH-RISK MEDICATION: Status: ACTIVE | Noted: 2017-04-14

## 2022-03-19 PROBLEM — G89.0 CENTRAL PAIN SYNDROME: Status: ACTIVE | Noted: 2017-04-14

## 2022-03-19 PROBLEM — E66.01 MORBID OBESITY (HCC): Status: ACTIVE | Noted: 2018-06-27

## 2022-03-19 PROBLEM — K21.9 GASTROESOPHAGEAL REFLUX DISEASE WITHOUT ESOPHAGITIS: Status: ACTIVE | Noted: 2018-06-27

## 2022-03-19 PROBLEM — E78.2 MIXED HYPERLIPIDEMIA: Status: ACTIVE | Noted: 2018-06-27

## 2022-03-19 PROBLEM — D89.9 DISORDER OF IMMUNE MECHANISM (HCC): Status: ACTIVE | Noted: 2017-04-13

## 2022-03-20 PROBLEM — M17.9 KNEE OSTEOARTHRITIS: Status: ACTIVE | Noted: 2021-12-29

## 2022-03-20 PROBLEM — D49.7 INTRADURAL EXTRAMEDULLARY SPINAL TUMOR: Status: ACTIVE | Noted: 2018-11-02

## 2022-03-20 PROBLEM — R29.818 POSITIVE LHERMITTE'S SIGN: Status: ACTIVE | Noted: 2017-04-14

## 2022-07-25 ENCOUNTER — OFFICE VISIT (OUTPATIENT)
Dept: ORTHOPEDIC SURGERY | Age: 52
End: 2022-07-25
Payer: MEDICARE

## 2022-07-25 VITALS — BODY MASS INDEX: 36.32 KG/M2 | TEMPERATURE: 96.9 F | HEIGHT: 69 IN | WEIGHT: 245.2 LBS

## 2022-07-25 DIAGNOSIS — M79.89 SWELLING OF RIGHT RING FINGER: ICD-10-CM

## 2022-07-25 DIAGNOSIS — M79.641 RIGHT HAND PAIN: ICD-10-CM

## 2022-07-25 DIAGNOSIS — R22.31 MASS OF RIGHT FINGER: Primary | ICD-10-CM

## 2022-07-25 PROCEDURE — 73130 X-RAY EXAM OF HAND: CPT | Performed by: ORTHOPAEDIC SURGERY

## 2022-07-25 PROCEDURE — G8756 NO BP MEASURE DOC: HCPCS | Performed by: ORTHOPAEDIC SURGERY

## 2022-07-25 PROCEDURE — G8432 DEP SCR NOT DOC, RNG: HCPCS | Performed by: ORTHOPAEDIC SURGERY

## 2022-07-25 PROCEDURE — G8417 CALC BMI ABV UP PARAM F/U: HCPCS | Performed by: ORTHOPAEDIC SURGERY

## 2022-07-25 PROCEDURE — G8427 DOCREV CUR MEDS BY ELIG CLIN: HCPCS | Performed by: ORTHOPAEDIC SURGERY

## 2022-07-25 PROCEDURE — 3017F COLORECTAL CA SCREEN DOC REV: CPT | Performed by: ORTHOPAEDIC SURGERY

## 2022-07-25 PROCEDURE — 99203 OFFICE O/P NEW LOW 30 MIN: CPT | Performed by: ORTHOPAEDIC SURGERY

## 2022-07-25 NOTE — PROGRESS NOTES
Shaka Simpson is a 45 y.o. female right handed . Worker's Compensation and legal considerations: none filed. Vitals:    22 0850   Temp: 96.9 °F (36.1 °C)   TempSrc: Temporal   Weight: 245 lb 3.2 oz (111.2 kg)   Height: 5' 9\" (1.753 m)   PainSc:   0 - No pain   PainLoc: Hand           Chief Complaint   Patient presents with    Hand Pain     Rt          HPI: Patient presents today with complaints of a mass of her right ring finger    Date of onset: Indeterminate    Injury: No    Prior Treatment:  No    Numbness/ Tingling: No      ROS: Review of Systems - General ROS: negative  Psychological ROS: negative  ENT ROS: negative  Allergy and Immunology ROS: negative  Hematological and Lymphatic ROS: negative  Respiratory ROS: no cough, shortness of breath, or wheezing  Cardiovascular ROS: no chest pain or dyspnea on exertion  Gastrointestinal ROS: no abdominal pain, change in bowel habits, or black or bloody stools  Musculoskeletal ROS: negative  Neurological ROS: negative  Dermatological ROS: negative    Past Medical History:   Diagnosis Date    Depression     GERD (gastroesophageal reflux disease)     Hypertension     IBS (irritable bowel syndrome)     Insomnia     Migraines     MS (multiple sclerosis) (HCC)     Prediabetes     Sleep apnea     mild, no CPAP    Urinary urgency     Vision decreased        Past Surgical History:   Procedure Laterality Date    HX  SECTION      x1    HX GYN      tubal ligation    HX HYSTERECTOMY  14    AK BREAST SURGERY PROCEDURE UNLISTED      reduction       Current Outpatient Medications   Medication Sig Dispense Refill    lisinopril-hydroCHLOROthiazide (PRINZIDE, ZESTORETIC) 20-25 mg per tablet       traMADoL (ULTRAM) 50 mg tablet Take 50 mg by mouth two (2) times daily as needed for Pain. Pain      oxyCODONE IR (ROXICODONE) 5 mg immediate release tablet Take 5 mg by mouth every four (4) hours as needed for Pain.       pregabalin (LYRICA) 150 mg capsule Take 150 mg by mouth three (3) times daily as needed for Pain. Pain in legs due to MS      simvastatin (ZOCOR) 20 mg tablet Take 20 mg by mouth nightly.  EPINEPHrine (EPIPEN) 0.3 mg/0.3 mL (1:1,000) injection 0.3 mL by IntraMUSCular route once as needed for up to 1 dose. (Patient taking differently: 0.3 mL by IntraMUSCular route once as needed for Anaphylaxis.) 0.6 mL 0    lisinopril (PRINIVIL, ZESTRIL) 10 mg tablet Take 10 mg by mouth daily.  ergocalciferol (ERGOCALCIFEROL) 1,250 mcg (50,000 unit) capsule Take 50,000 Units by mouth every seven (7) days.  estradioL (ESTRACE) 0.01 % (0.1 mg/gram) vaginal cream Insert 2 g into vagina daily. (Patient not taking: Reported on 7/25/2022) 1 g 2    acetaminophen (TYLENOL) 500 mg tablet Take 500 mg by mouth every six (6) hours as needed for Pain. Pain (Patient not taking: Reported on 7/25/2022)      aspirin (ASPIRIN) 325 mg tablet Take 325 mg by mouth two (2) times a day. (Patient not taking: Reported on 7/25/2022)      celecoxib (CELEBREX) 200 mg capsule Take 200 mg by mouth two (2) times a day. (Patient not taking: Reported on 7/25/2022)      polyethylene glycol (MIRALAX) 17 gram packet Take 17 g by mouth daily as needed for Constipation. dissolve in 8 oz of water For constipation relief (Patient not taking: Reported on 7/25/2022)      teriflunomide (AUBAGIO) 14 mg tablet Take 14 mg by mouth daily.  (Patient not taking: Reported on 7/25/2022)         Allergies   Allergen Reactions    Bromelains Other (comments)    Dimethyl Fumarate Rash and Other (comments)    Gabapentin Nausea and Vomiting and Other (comments)    Mushroom Other (comments)    Mushroom Flavor Other (comments)     Mushroom,    Natalizumab Rash, Shortness of Breath and Other (comments)    Olive Other (comments)    Olive Extract Other (comments)    Peginterferon Beta-1a Other (comments)    Shellfish Derived Other (comments)           PE:     Physical Exam  Vitals and nursing note reviewed. Constitutional:       General: She is not in acute distress. Appearance: Normal appearance. She is not ill-appearing. Cardiovascular:      Pulses: Normal pulses. Pulmonary:      Effort: Pulmonary effort is normal. No respiratory distress. Musculoskeletal:         General: Swelling and tenderness present. No deformity or signs of injury. Normal range of motion. Cervical back: Normal range of motion and neck supple. Right lower leg: No edema. Left lower leg: No edema. Skin:     General: Skin is warm and dry. Capillary Refill: Capillary refill takes less than 2 seconds. Findings: No bruising or erythema. Neurological:      General: No focal deficit present. Mental Status: She is alert and oriented to person, place, and time. Psychiatric:         Mood and Affect: Mood normal.         Behavior: Behavior normal.          Right hand: There is about mass about the PIP joint right ring finger. This is minimally tender. This is immobile and noncompressible. This is nonpigmented. Neurovascularly intact. The mass is approximately 1-1/2 cm in diameter. Imagin2022 3 views of right hand is significant for mild degenerative changes throughout the small joints. There is a soft tissue enlargement about the ring finger. ICD-10-CM ICD-9-CM    1. Mass of right finger  R22.31 782.2 MRI HAND RT W WO CONT      2. Right hand pain  M79.641 729.5 AMB POC XRAY, HAND; 3+ VIEWS      3. Swelling of right ring finger  M79.89 729.81 MRI HAND RT W WO CONT            Plan:     MRI of right ring finger with and without contrast to evaluate for soft tissue mass and preoperative planning. Follow-up and Dispositions    Return for MRI Review.           Plan was reviewed with patient, who verbalized agreement and understanding of the plan

## 2022-09-16 DIAGNOSIS — R22.31 MASS OF RIGHT FINGER: ICD-10-CM

## 2022-09-16 DIAGNOSIS — M79.89 SWELLING OF RIGHT RING FINGER: ICD-10-CM

## 2022-11-03 ENCOUNTER — OFFICE VISIT (OUTPATIENT)
Dept: ORTHOPEDIC SURGERY | Age: 52
End: 2022-11-03
Payer: MEDICARE

## 2022-11-03 VITALS — WEIGHT: 254 LBS | HEIGHT: 70 IN | BODY MASS INDEX: 36.36 KG/M2 | TEMPERATURE: 97.1 F

## 2022-11-03 DIAGNOSIS — D48.1 GIANT CELL TUMOR OF TENDON SHEATH: Primary | ICD-10-CM

## 2022-11-03 DIAGNOSIS — M79.89 SWELLING OF RIGHT RING FINGER: ICD-10-CM

## 2022-11-03 DIAGNOSIS — R22.31 MASS OF RIGHT FINGER: ICD-10-CM

## 2022-11-03 PROCEDURE — G8756 NO BP MEASURE DOC: HCPCS | Performed by: ORTHOPAEDIC SURGERY

## 2022-11-03 PROCEDURE — 3017F COLORECTAL CA SCREEN DOC REV: CPT | Performed by: ORTHOPAEDIC SURGERY

## 2022-11-03 PROCEDURE — G8432 DEP SCR NOT DOC, RNG: HCPCS | Performed by: ORTHOPAEDIC SURGERY

## 2022-11-03 PROCEDURE — G8427 DOCREV CUR MEDS BY ELIG CLIN: HCPCS | Performed by: ORTHOPAEDIC SURGERY

## 2022-11-03 PROCEDURE — 99214 OFFICE O/P EST MOD 30 MIN: CPT | Performed by: ORTHOPAEDIC SURGERY

## 2022-11-03 PROCEDURE — G8417 CALC BMI ABV UP PARAM F/U: HCPCS | Performed by: ORTHOPAEDIC SURGERY

## 2022-11-03 NOTE — PROGRESS NOTES
Bruno Simpson is a 45 y.o. female right handed . Worker's Compensation and legal considerations: none filed. Vitals:    22 1331   Temp: 97.1 °F (36.2 °C)   TempSrc: Temporal   Weight: 254 lb (115.2 kg)   Height: 5' 10\" (1.778 m)   PainSc:   0 - No pain   PainLoc: Hand           Chief Complaint   Patient presents with    Hand Pain     Right hand pain       HPI: Patient presents today for follow-up of right ring finger MRI. She is currently in school. Initial HPI: Patient presents today with complaints of a mass of her right ring finger    Date of onset: Approximately     Injury: No    Prior Treatment:  No    Numbness/ Tingling: No      ROS: Review of Systems - General ROS: negative  Psychological ROS: negative  ENT ROS: negative  Allergy and Immunology ROS: negative  Hematological and Lymphatic ROS: negative  Respiratory ROS: no cough, shortness of breath, or wheezing  Cardiovascular ROS: no chest pain or dyspnea on exertion  Gastrointestinal ROS: no abdominal pain, change in bowel habits, or black or bloody stools  Musculoskeletal ROS: negative  Neurological ROS: negative  Dermatological ROS: negative    Past Medical History:   Diagnosis Date    Depression     GERD (gastroesophageal reflux disease)     Hypertension     IBS (irritable bowel syndrome)     Insomnia     Migraines     MS (multiple sclerosis) (HCC)     Prediabetes     Sleep apnea     mild, no CPAP    Urinary urgency     Vision decreased        Past Surgical History:   Procedure Laterality Date    HX  SECTION      x1    HX GYN  2002    tubal ligation    HX HYSTERECTOMY  14    IN BREAST SURGERY PROCEDURE UNLISTED      reduction       Current Outpatient Medications   Medication Sig Dispense Refill    dulaglutide (Trulicity) 6.05 KJ/0.9 mL sub-q pen INJECT 0.5 ML UNDER THE SKIN EVERY 7 DAYS      teriflunomide (AUBAGIO) 14 mg tablet Take 14 mg by mouth in the morning.       lisinopril (PRINIVIL, ZESTRIL) 10 mg tablet Take 10 mg by mouth daily. naloxone (NARCAN) 4 mg/actuation nasal spray CALL 911. SPR CONTENTS OF ONE SPRAYER (0.1ML) INTO ONE NOSTRIL. REPEAT IN 2-3 MIN IF SYMPTOMS OF OPIOID EMERGENCY PERSIST, ALTERNATE NOSTRILS (Patient not taking: No sig reported)      nitrofurantoin, macrocrystal-monohydrate, (MACROBID) 100 mg capsule  (Patient not taking: No sig reported)      lisinopril-hydroCHLOROthiazide (PRINZIDE, ZESTORETIC) 20-25 mg per tablet  (Patient not taking: No sig reported)      estradioL (ESTRACE) 0.01 % (0.1 mg/gram) vaginal cream Insert 2 g into vagina daily. (Patient not taking: No sig reported) 1 g 2    acetaminophen (TYLENOL) 500 mg tablet Take 500 mg by mouth every six (6) hours as needed for Pain. Pain (Patient not taking: No sig reported)      oxyCODONE IR (ROXICODONE) 5 mg immediate release tablet Take 5 mg by mouth every four (4) hours as needed for Pain. (Patient not taking: No sig reported)      aspirin (ASPIRIN) 325 mg tablet Take 325 mg by mouth two (2) times a day. (Patient not taking: No sig reported)      celecoxib (CELEBREX) 200 mg capsule Take 200 mg by mouth two (2) times a day. (Patient not taking: No sig reported)      polyethylene glycol (MIRALAX) 17 gram packet Take 17 g by mouth daily as needed for Constipation. dissolve in 8 oz of water For constipation relief (Patient not taking: No sig reported)      pregabalin (LYRICA) 150 mg capsule Take 150 mg by mouth three (3) times daily as needed for Pain. Pain in legs due to MS (Patient not taking: No sig reported)      simvastatin (ZOCOR) 20 mg tablet Take 20 mg by mouth nightly. (Patient not taking: No sig reported)      EPINEPHrine (EPIPEN) 0.3 mg/0.3 mL (1:1,000) injection 0.3 mL by IntraMUSCular route once as needed for up to 1 dose. (Patient not taking: No sig reported) 0.6 mL 0    ergocalciferol (ERGOCALCIFEROL) 1,250 mcg (50,000 unit) capsule Take 50,000 Units by mouth every seven (7) days.  (Patient not taking: No sig reported) Allergies   Allergen Reactions    Adhesive Tape-Silicones Itching    Bromelains Other (comments)    Dimethyl Fumarate Rash and Other (comments)    Gabapentin Nausea and Vomiting and Other (comments)    Mushroom Other (comments)    Mushroom Flavor Other (comments)     Mushroom,    Natalizumab Rash, Shortness of Breath and Other (comments)    Olive Other (comments)    Olive Extract Other (comments)    Peginterferon Beta-1a Other (comments)    Shellfish Derived Other (comments)           PE:     Physical Exam  Vitals and nursing note reviewed. Constitutional:       General: She is not in acute distress. Appearance: Normal appearance. She is not ill-appearing. Cardiovascular:      Pulses: Normal pulses. Pulmonary:      Effort: Pulmonary effort is normal. No respiratory distress. Musculoskeletal:         General: Swelling present. No tenderness, deformity or signs of injury. Normal range of motion. Cervical back: Normal range of motion and neck supple. Right lower leg: No edema. Left lower leg: No edema. Skin:     General: Skin is warm and dry. Capillary Refill: Capillary refill takes less than 2 seconds. Findings: No bruising or erythema. Neurological:      General: No focal deficit present. Mental Status: She is alert and oriented to person, place, and time. Psychiatric:         Mood and Affect: Mood normal.         Behavior: Behavior normal.      Exam unchanged from previous:    Right hand: There is about mass about the PIP joint right ring finger. This is minimally tender. This is immobile and noncompressible. This is nonpigmented. Neurovascularly intact. The mass is approximately 1-1/2 cm in diameter. Imagin/8/2022 right ring finger MRI without contrast:  Impression:  #1 large lobulated mass palmar aspect middle phalanx fourth digit. 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 No aggressive features. Recommend excision. #2 tendons intact.   #3 no fracture. 7/25/2022 3 views of right hand is significant for mild degenerative changes throughout the small joints. There is a soft tissue enlargement about the ring finger. 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 Mass of right finger  R22.31 782.2       3. Swelling of right ring finger  M79.89 729.81             Plan:     Discussed doing the surgery in March. Patient is currently in school and would like to wait at this time. I think this is reasonable given the noninvasive characteristics of the mass as well as the chronicity of the mass. Follow-up and Dispositions    Return in about 3 months (around 2/3/2023) for Reevaluation and surgical discussion.             Plan was reviewed with patient, who verbalized agreement and understanding of the plan

## 2022-11-03 NOTE — LETTER
11/3/2022    Patient: Bruno Simpson   YOB: 1970   Date of Visit: 11/3/2022     Tramaine Cheema MD  [address]  [address] [facility]  Via Fax: [phone]    Dear Tramaine Cheema MD,      Thank you for referring Ms. Danette Simpson to [address] [place] [address] for evaluation. My notes for this consultation are attached. If you have questions, please do not hesitate to call me. I look forward to following your patient along with you.       Sincerely,    Naveed Numbers, DO

## 2023-05-30 DIAGNOSIS — M25.561 RIGHT KNEE PAIN, UNSPECIFIED CHRONICITY: Primary | ICD-10-CM

## 2023-06-02 ENCOUNTER — OFFICE VISIT (OUTPATIENT)
Age: 53
End: 2023-06-02

## 2023-06-02 VITALS — BODY MASS INDEX: 36.36 KG/M2 | WEIGHT: 254 LBS | HEIGHT: 70 IN

## 2023-06-02 DIAGNOSIS — M25.561 RIGHT KNEE PAIN, UNSPECIFIED CHRONICITY: Primary | ICD-10-CM

## 2023-06-02 DIAGNOSIS — M17.11 OSTEOARTHRITIS OF RIGHT KNEE, UNSPECIFIED OSTEOARTHRITIS TYPE: ICD-10-CM

## 2023-06-02 DIAGNOSIS — Z01.818 PRE-OP TESTING: ICD-10-CM

## 2023-06-05 DIAGNOSIS — M25.561 RIGHT KNEE PAIN, UNSPECIFIED CHRONICITY: ICD-10-CM

## 2023-06-14 DIAGNOSIS — Z01.818 PRE-OP TESTING: ICD-10-CM

## 2023-06-14 DIAGNOSIS — M17.11 OSTEOARTHRITIS OF RIGHT KNEE, UNSPECIFIED OSTEOARTHRITIS TYPE: ICD-10-CM

## 2023-06-14 DIAGNOSIS — M25.561 RIGHT KNEE PAIN, UNSPECIFIED CHRONICITY: ICD-10-CM

## 2023-06-15 DIAGNOSIS — M17.11 OSTEOARTHRITIS OF RIGHT KNEE, UNSPECIFIED OSTEOARTHRITIS TYPE: ICD-10-CM

## 2023-06-15 DIAGNOSIS — Z01.818 PRE-OP TESTING: ICD-10-CM

## 2023-06-15 DIAGNOSIS — M25.561 RIGHT KNEE PAIN, UNSPECIFIED CHRONICITY: ICD-10-CM

## 2023-06-16 DIAGNOSIS — M17.11 OSTEOARTHRITIS OF RIGHT KNEE, UNSPECIFIED OSTEOARTHRITIS TYPE: ICD-10-CM

## 2023-06-16 DIAGNOSIS — Z01.818 PRE-OP TESTING: ICD-10-CM

## 2023-06-16 DIAGNOSIS — M25.561 RIGHT KNEE PAIN, UNSPECIFIED CHRONICITY: ICD-10-CM

## 2023-07-06 DIAGNOSIS — Z96.651 STATUS POST TOTAL RIGHT KNEE REPLACEMENT: Primary | ICD-10-CM

## 2023-07-17 ENCOUNTER — OFFICE VISIT (OUTPATIENT)
Age: 53
End: 2023-07-17

## 2023-07-17 VITALS — BODY MASS INDEX: 36.36 KG/M2 | HEIGHT: 70 IN | WEIGHT: 254 LBS

## 2023-07-17 DIAGNOSIS — M17.11 OSTEOARTHRITIS OF RIGHT KNEE, UNSPECIFIED OSTEOARTHRITIS TYPE: Primary | ICD-10-CM

## 2023-07-17 RX ORDER — OXYCODONE HYDROCHLORIDE AND ACETAMINOPHEN 5; 325 MG/1; MG/1
1 TABLET ORAL
Qty: 30 TABLET | Refills: 0 | Status: SHIPPED | OUTPATIENT
Start: 2023-07-17 | End: 2023-07-25

## 2023-07-17 RX ORDER — CEPHALEXIN 500 MG/1
500 CAPSULE ORAL EVERY 8 HOURS
Qty: 9 CAPSULE | Refills: 0 | Status: SHIPPED | OUTPATIENT
Start: 2023-07-17 | End: 2023-07-20

## 2023-07-17 RX ORDER — ONDANSETRON 8 MG/1
8 TABLET, ORALLY DISINTEGRATING ORAL EVERY 8 HOURS PRN
Qty: 20 TABLET | Refills: 0 | Status: SHIPPED | OUTPATIENT
Start: 2023-07-17 | End: 2023-07-24

## 2023-07-17 NOTE — PROGRESS NOTES
8/10.    Assessment/Plan:  Plan will be for right total knee replacement at [facility], outpatient surgery. Percocet, Keflex, Zofran and aspirin for DVT prophylaxis. We will go from there. As part of continued conservative pain management options the patient was advised to utilize Tylenol or OTC NSAIDS as long as it is not medically contraindicated. Return to Office: Follow-up and Dispositions    Return for ALREADY SCHEDULED FOR SURGERY. Scribed by Eloisa Nayak LPN as dictated by [facility] GLEN Quintanilla MD.  Documentation, performed by, True and Accepted Luciano Quintanilla MD

## 2023-08-03 ENCOUNTER — OFFICE VISIT (OUTPATIENT)
Age: 53
End: 2023-08-03

## 2023-08-03 DIAGNOSIS — M25.561 RIGHT KNEE PAIN, UNSPECIFIED CHRONICITY: Primary | ICD-10-CM

## 2023-08-03 PROCEDURE — 99024 POSTOP FOLLOW-UP VISIT: CPT | Performed by: ORTHOPAEDIC SURGERY

## 2023-08-03 RX ORDER — PREGABALIN 150 MG/1
CAPSULE ORAL
COMMUNITY
Start: 2023-07-27

## 2023-08-03 RX ORDER — OXYCODONE AND ACETAMINOPHEN 7.5; 325 MG/1; MG/1
TABLET ORAL
COMMUNITY
Start: 2023-07-25

## 2023-08-03 RX ORDER — LISINOPRIL AND HYDROCHLOROTHIAZIDE 25; 20 MG/1; MG/1
TABLET ORAL
COMMUNITY
Start: 2023-07-16

## 2023-08-03 RX ORDER — ERGOCALCIFEROL 1.25 MG/1
CAPSULE ORAL
COMMUNITY
Start: 2023-07-16

## 2023-08-03 RX ORDER — SIMVASTATIN 20 MG
TABLET ORAL
COMMUNITY
Start: 2023-07-16

## 2023-08-03 RX ORDER — MONOMETHYL FUMARATE 95 MG/1
CAPSULE ORAL
COMMUNITY
Start: 2023-07-26

## 2023-08-03 RX ORDER — IBUPROFEN 800 MG/1
TABLET ORAL
COMMUNITY
Start: 2023-07-17

## 2023-08-03 RX ORDER — METFORMIN HYDROCHLORIDE 500 MG/1
TABLET, EXTENDED RELEASE ORAL
COMMUNITY
Start: 2023-07-16

## 2023-08-03 RX ORDER — SULFAMETHOXAZOLE AND TRIMETHOPRIM 800; 160 MG/1; MG/1
TABLET ORAL
COMMUNITY
Start: 2023-07-13

## 2023-08-03 NOTE — PROGRESS NOTES
Name: Darien Reis    : 1970     [address], [address] [address]  Dept: [phone]  Dept Fax: [phone]     Chief Complaint   Patient presents with    Post-Op Check    Knee Pain        There were no vitals taken for this visit. Allergies   Allergen Reactions    Natalizumab Other (See Comments), Rash and Shortness Of Breath    Adhesive Tape Itching    Bromelains Other (See Comments)    Mushroom Extract Complex Other (See Comments)    Olive Oil Other (See Comments)    Peginterferon Beta-1a Other (See Comments)    Shellfish Allergy Other (See Comments)    Dimethyl Fumarate Other (See Comments) and Rash    Gabapentin Nausea And Vomiting and Other (See Comments)        Current Outpatient Medications   Medication Sig Dispense Refill    ibuprofen (ADVIL;MOTRIN) 800 MG tablet TAKE 1 TABLET EVERY 6 HOURS AS NEEDED      vitamin D (ERGOCALCIFEROL) 1.25 MG (07180 UT) CAPS capsule       metFORMIN (GLUCOPHAGE-XR) 500 MG extended release tablet       lisinopril-hydroCHLOROthiazide (PRINZIDE;ZESTORETIC) 20-25 MG per tablet       BAFIERTAM 95 MG CPDR       oxyCODONE-acetaminophen (PERCOCET) 7.5-325 MG per tablet TAKE 1 TABLET BY MOUTH EVERY 8 HOURS FOR CHRONIC PAIN      pregabalin (LYRICA) 150 MG capsule       simvastatin (ZOCOR) 20 MG tablet       sulfamethoxazole-trimethoprim (BACTRIM DS;SEPTRA DS) 800-160 MG per tablet TAKE 1 TABLET BY MOUTH EVERY 12 HOURS FOR 5 DAYS      Dulaglutide (TRULICITY) 8.64 AK/0.5GM SOPN INJECT 0.5 ML UNDER THE SKIN EVERY 7 DAYS      lisinopril (PRINIVIL;ZESTRIL) 10 MG tablet Take 10 mg by mouth daily      Teriflunomide 14 MG TABS Take 14 mg by mouth daily       No current facility-administered medications for this visit.       Patient Active Problem List   Diagnosis    Acute midline low back pain with left-sided sciatica    HNP (herniated nucleus pulposus), lumbar

## 2023-10-05 DIAGNOSIS — Z96.651 STATUS POST TOTAL RIGHT KNEE REPLACEMENT: Primary | ICD-10-CM

## (undated) DEVICE — ZIMMER® STERILE DISPOSABLE TOURNIQUET CUFF WITH PROTECTIVE SLEEVE AND PLC, DUAL PORT, SINGLE BLADDER, 34 IN. (86 CM)

## (undated) DEVICE — NDL BLK NRV 30 DEG 21GX4IN --

## (undated) DEVICE — ELASTIC BANDAGE 6": Brand: CARDINAL HEALTH

## (undated) DEVICE — Device: Brand: JELCO

## (undated) DEVICE — SOLUTION IRRIG 3000ML 0.9% SOD CHL FLX CONT 0797208] ICU MEDICAL INC]

## (undated) DEVICE — STRYKER PERFORMANCE SERIES SAGITTAL BLADE: Brand: STRYKER PERFORMANCE SERIES

## (undated) DEVICE — BIPOLAR SEALER 23-301-1 AQM MBS: Brand: AQUAMANTYS™

## (undated) DEVICE — KIT CLN UP BON SECOURS MARYV

## (undated) DEVICE — BOWL AND CEMENT CARTRIDGE WITH BREAKAWAY FEMORAL NOZZLE: Brand: ACM

## (undated) DEVICE — SOLUTION IV 1000ML 0.9% SOD CHL

## (undated) DEVICE — GARMENT,MEDLINE,DVT,INT,CALF,MED, GEN2: Brand: MEDLINE

## (undated) DEVICE — BLANKET WRM W29.9XL79.1IN UP BODY FORC AIR MISTRAL-AIR

## (undated) DEVICE — SUTURE MCRYL SZ 2-0 L36IN ABSRB UD L36MM CT-1 1/2 CIR Y945H

## (undated) DEVICE — HANDPIECE SET WITH HIGH FLOW TIP AND SUCTION TUBE: Brand: INTERPULSE

## (undated) DEVICE — PACK SURG BSHR TOT KNEE LF

## (undated) DEVICE — 3M™ TEGADERM™ TRANSPARENT FILM DRESSING FRAME STYLE, 1626W, 4 IN X 4-3/4 IN (10 CM X 12 CM), 50/CT 4CT/CASE: Brand: 3M™ TEGADERM™

## (undated) DEVICE — SUTURE VCRL SZ 2 L27IN ABSRB VLT L65MM TP-1 1/2 CIR J649G

## (undated) DEVICE — REM POLYHESIVE ADULT PATIENT RETURN ELECTRODE: Brand: VALLEYLAB

## (undated) DEVICE — INTENDED FOR TISSUE SEPARATION, AND OTHER PROCEDURES THAT REQUIRE A SHARP SURGICAL BLADE TO PUNCTURE OR CUT.: Brand: BARD-PARKER SAFETY BLADES SIZE 15, STERILE

## (undated) DEVICE — Z DISCONTINUED BY MEDLINE USE 2711682 TRAY SKIN PREP DRY W/ PREM GLV

## (undated) DEVICE — SUTURE VCRL SZ 0 L36IN ABSRB UD L36MM CT-1 1/2 CIR J946H

## (undated) DEVICE — JP 3-SPRING RES W/15FR PVC DRAIN/TR: Brand: CARDINAL HEALTH

## (undated) DEVICE — GUIDEPIN ORTH THRD HI PERF HD SIG

## (undated) DEVICE — NEEDLE HYPO 21GA L1.5IN INTRAMUSCULAR S STL LATCH BVL UP

## (undated) DEVICE — PREP SKN CHLRAPRP APL 26ML STR --

## (undated) DEVICE — Device

## (undated) DEVICE — BNDG ELAS HK LOOP 6X5YD NS -- MATRIX

## (undated) DEVICE — PADDING CAST W6INXL4YD ST COT COHESIVE HND TEARABLE SPEC

## (undated) DEVICE — DECANTER BAG 9": Brand: MEDLINE INDUSTRIES, INC.

## (undated) DEVICE — INTENDED FOR TISSUE SEPARATION, AND OTHER PROCEDURES THAT REQUIRE A SHARP SURGICAL BLADE TO PUNCTURE OR CUT.: Brand: BARD-PARKER SAFETY BLADES SIZE 10, STERILE

## (undated) DEVICE — BLADE RMFG DBL RECIP DBL SD --

## (undated) DEVICE — TAPE,CLOTH/SILK,CURAD,3"X10YD,LF,40/CS: Brand: CURAD

## (undated) DEVICE — Z DISCONTINUED USE 2744636  DRESSING AQUACEL 14 IN ALG W3.5XL14IN POLYUR FLM CVR W/ HYDRCOLL

## (undated) DEVICE — PIN DRL QUIK HI PERF FOR SIG SYS

## (undated) DEVICE — BLANKET WRM AD W50XL85.8IN PACU FULL BODY FORC AIR

## (undated) DEVICE — DRESSING FOAM DISK DIA1IN H 7MM HYDRPHLC CHG IMPREG IN SL

## (undated) DEVICE — 4-PORT MANIFOLD: Brand: NEPTUNE 2

## (undated) DEVICE — SYR LR LCK 1ML GRAD NSAF 30ML --